# Patient Record
Sex: FEMALE | Race: WHITE
[De-identification: names, ages, dates, MRNs, and addresses within clinical notes are randomized per-mention and may not be internally consistent; named-entity substitution may affect disease eponyms.]

---

## 2017-01-03 ENCOUNTER — HOSPITAL ENCOUNTER (OUTPATIENT)
Dept: HOSPITAL 75 - SDC | Age: 82
Discharge: HOME | End: 2017-01-03
Attending: INTERNAL MEDICINE
Payer: MEDICARE

## 2017-01-03 VITALS — SYSTOLIC BLOOD PRESSURE: 131 MMHG | DIASTOLIC BLOOD PRESSURE: 68 MMHG

## 2017-01-03 VITALS — WEIGHT: 130.5 LBS | HEIGHT: 64 IN | BODY MASS INDEX: 22.28 KG/M2

## 2017-01-03 VITALS — SYSTOLIC BLOOD PRESSURE: 143 MMHG | DIASTOLIC BLOOD PRESSURE: 51 MMHG

## 2017-01-03 VITALS — SYSTOLIC BLOOD PRESSURE: 124 MMHG | DIASTOLIC BLOOD PRESSURE: 68 MMHG

## 2017-01-03 DIAGNOSIS — K52.9: Primary | ICD-10-CM

## 2017-01-03 RX ADMIN — MIDAZOLAM HYDROCHLORIDE PRN MG: 1 INJECTION, SOLUTION INTRAMUSCULAR; INTRAVENOUS at 08:18

## 2017-01-03 RX ADMIN — FENTANYL CITRATE PRN MCG: 50 INJECTION, SOLUTION INTRAMUSCULAR; INTRAVENOUS at 08:20

## 2017-01-03 RX ADMIN — MIDAZOLAM HYDROCHLORIDE PRN MG: 1 INJECTION, SOLUTION INTRAMUSCULAR; INTRAVENOUS at 08:10

## 2017-01-03 RX ADMIN — FENTANYL CITRATE PRN MCG: 50 INJECTION, SOLUTION INTRAMUSCULAR; INTRAVENOUS at 08:11

## 2017-01-03 NOTE — PRE-OP NOTE & CONSCIOUS SEDAT
Pre-Operative Progress Note


H&P Reviewed


The H&P was reviewed, patient examined and no changes noted.


Date H&P Reviewed:  Rupert 3, 2017


Time H&P Reviewed:  08:00





Conscious Sedation Pre-Proced


ASA Class:  3











Airway Mallampati Classification: ([place] appropriate class) I.  II.  III,  IV


 


Lungs 


 


Heart 


 


 ASA score


 


 ASA 1: a normal healthy patient


 


 ASA 2:  a patient with a mild systemic disease (mid diabetes, controlled 

hypertension, obesity 


 


 ASA 3:  a patient with a severe systemic disease that limits activity  (angina

, COPD, prior Myocardial infarction)


 


 ASA 4:  a patient with an incapacitating disease that is a constant threat to 

life (CHF, renal failure)


 


 ASA 5:  a moribund patient not expected to survive 24 hrs.  (ruptured aneurysm)


 


 ASA 6:  a declared brain dead patient whose organs are being harvested.


 


 For emergent operations, add the letter E after the classification








Grade 2


Sedation Plan:  Analgesia, Amnesia, Plan communicated to team members, 

Discussed options with patient/fam, Discussed risks with patient/fam


Note


The patient is an appropriate candidate to undergo the planned procedure, 

sedation, and anesthesia.





The patient immediately re-assessed prior to indication.








LIZETTE ROSAS MD Rupert 3, 2017 08:00

## 2017-01-03 NOTE — HISTORY AND PHYSICAL
DATE OF ADMISSION:  01/03/2017

 

COLONOSCOPY HISTORY AND PHYSICAL:  

 

DICTATING PHYSICIAN:   

Dr. Morris 

 

Ms. Sellers is a frail 84-year-old white female who awoke from

sleep with abdominal discomfort. This was followed by vomiting

and then bright red blood per rectum. This occurred Wednesday

night.  Thursday morning she presented to the emergency room

having had 1 or 2 more small volume loose, bloody stools. She

denied chills or fever. She comes with her , who actually

she defers to answering a number of the questions placed to her.

She was alert and oriented and appropriate during the interview

and appropriately recalling past medical history. She believes

that she has been slowly losing weight and reports no past

history of colonoscopy or GI procedure. She does have a past

history of breast cancer for which she underwent mastectomy at

the age of 27. She had apparent prophylactic mastectomy in 1993

on the opposite side. She tends towards constipation; had not had

any bowel habit changes up until Wednesday evening. Currently her

diarrhea has resolved. Dr. Gannon did a rectal examination in the

emergency room and reported bloody mucus only and no

abnormalities to digital inspection. A CT scan was obtained and

there was an area of thickening noted in the descending colon,

without abnormal pericolonic inflammatory findings.  There was no

evidence for distention and no evidence for reported diverticular

disease. There was a 7 mm micronodular density in the left

lateral lobe of the liver but it was stable compared to a CT scan

done in July 2013. 

 

PAST MEDICAL HISTORY:

Significant for depression and urinary incontinence with

overactive bladder. She follows with Dr. Tawil for this and had

the vaginal sling procedure 5 years ago that did not improve her

incontinence per her report.  She has a past history of

depression and does have frequent urinary tract infection for

which she is on trimethoprim for prophylaxis at h.s. 100 mg.  

 

OTHER MEDICATIONS:

Include: 

1.   Trazodone 50 mg daily. 

2.   Senna 1 tablet b.i.d. 

3.   paroxetine 40 mg daily. 

4.   Myrbetriq 25 mg daily 

5.   L-thyroxine 75 micrograms daily. 

6.   Ibuprofen 600 mg q.6 hours p.r.n.  

7.   Nexium 40 mg daily. 

8.   Acetaminophen p.r.n. 

 

SOCIAL HISTORY:

She is retired, lives with her  at home and who is her

primary caregiver. She presented to the office in a wheelchair.

She has a past 40 pack-year smoking history but quit 30 years ago

with no significant alcohol intake. 

 

FAMILY HISTORY:

She is not aware of any family history for colon cancer. 

 

PHYSICAL EXAMINATION: Revealed a frail, elderly white female who

appeared to be in no acute distress. 

NECK: Revealed no JVD, adenopathy or bruits. 

CHEST: Clear. 

CV: Revealed a regular rate and rhythm with a soft 1 to 2/6

systolic ejection murmur heard best at the second right

intercostal space without evidence for pulsus parvus or tardus. 

ABDOMEN: Soft. Supple without masses, organomegaly or tenderness.

Bowel sounds are positive. No bruits are noted. 

EXTREMITIES: Reveal 1+ bilateral edema to the mid tibia. 

 

LABORATORY EVALUATION:

From the emergency room revealed hemoglobin of 14.1. Her

chemistry panel was unremarkable except for a BUN of 19,

creatinine was 0.8. Blood sugar was mildly elevated at 140,

nonfasting.  Macrocytosis was reported with an MCV of 106,

platelet count was normal at 246. 

 

ASSESSMENT:

1.   For further investigation of CT abnormalities in the

descending colon concerning for cancer with bight red blood per

rectum, the patient was given the sports drink prep and set up

for Tuesday the 3rd. In explaining colonoscopy and review of her

emergency room records, history obtaining and answering

questions, 45 minutes of care time was spent by myself with

another 15 minutes of staff time setting up the procedure and

going over prep instructions.  In addition we will have the

patient take 4 mg of Zofran an hour before starting the prep. 

2.   Macrocytosis without anemia, we will defer to Dr. Martin in

regards to whether or not a B12 level needs to be drawn if this

has not been done in the recent past. 

 

Sincerely, 

 

 

 

Job ID: [id number] 

Dictated Date: 12/30/2016 12:14:00 

Transcription Date: 12/30/2016 16:16:32/astrid GUERRERO

## 2017-01-03 NOTE — PROCEDURE REPORT
PROCEDURE PHYSICIAN:   LIZETTE ROSAS

 

DATE OF PROCEDURE:  01/03/2017

 

COLONOSCOPY SUMMARY:  

 

PRIMARY CARE PHYSICIAN: 

Dr. Martin 

 

INDICATION FOR THE PROCEDURE:

Rectal bleeding, abnormal descending colonic findings on CT

abdomen. 

 

PROCEDURE:

The patient was placed in left lateral decubitus position. Prior

to undergoing colonoscopy, digital rectal evaluation was

performed. Anal sphincter tone was lax and the perianal reflex

was not elicited. No other abnormalities were noted to digital

inspection of the distal rectal vault or anal canal. The

colonoscope was inserted into the rectum and under visualization,

advanced to the cecum. The cecum was identified by identification

of the ileocecal valve and cecal strap.  Careful inspection was

made as the colonoscope was withdrawn. The patient had more

sensitivity than expected considering her age and required little

more medication than I would have expected. 

 

FINDINGS:

There was no evidence for internal or external hemorrhoids. The

rectum was unremarkable. The sigmoid colon was unremarkable as

well. No diverticulum were noted. No evidence for neoplasia was

noted. The majority of the descending colon was erythematous with

mild edema. No evidence for ulceration was noted. A biopsy was

obtained. No induration of the colon in these areas was present

nor was significant friability noted. The transverse colon,

hepatic flexure, ascending colon and cecum were unremarkable. 

 

ASSESSMENT:

Diffuse inflammatory change involving the majority of the

descending colon is present without evidence for ulceration.

Considering this patient's advanced age and frail status, resolving

ischemic colitis is strongly suspected. A biopsy was obtained and

submitted for histopathology. There is no evidence for neoplasia

on today's evaluation. Only other abnormality noted was lax anal

sphincter tone and absence of the perianal reflex. The patient

was reassured by today's findings.  Symptoms have improved, there

has been no further rectal bleeding. If pathology suggests

something other than ischemic colitis, there will be an addendum

to this report. I thank you for the referral of this pleasant

lady. 

 

Sincerely,  

 

 

 

 

Job ID: [id number]

Dictated Date: 01/03/2017 08:54:11 

Transcription Date: 01/03/2017 09:43:43 / astrid GUERRERO

## 2017-01-03 NOTE — XMS REPORT
Continuity of Care Document

 Created on: 2016



SARAH GARCIA

External Reference #: [id number]

: 1932

Sex: Female



Demographics







 Address  [address] [place] [address]  [address]

 

 Home Phone  [phone]

 

 Preferred Language  English

 

 Marital Status  Unknown

 

 Religion Affiliation  Unknown

 

 Race  Unknown

 

 Ethnic Group  Unknown





Author







 Author  [facility]

 

 Organization  [facility]

 

 Address  Unknown

 

 Phone  Unavailable







Support







 Name  Relationship  Address  Phone

 

 CULLEN SHEPHERD MD  Caregiver  [address], [address]

[place]  [address] [phone]

 

 GUY VILLELA MD  Caregiver  [address] [place]  [address] [phone]

 

 ANAND GARCIA  Next Of Kin  [address] [place] [address]  [address] [phone]







Care Team Providers







 Care Team Member Name  Role  Phone

 

 GUY VILLELA MD  PCP  [phone]







Insurance Providers







 Payer Name  Policy Number  Subscriber Name  Relationship

 

 [employer]  [id number]  Sarah Garcia  18 Self / Same As Patient

 

  [employer] Life  [id number]  Anand Garcia  01 







Advance Directives







 Directive  Response  Recorded Date/Time

 

 Advance Directives  No  16 2:31pm

 

 Health Care Power of   No  16 2:31pm

 

 Organ Donor  No  16 2:31pm

 

 Resuscitation Status  Full Code  16 2:31pm







Chief Complaint and Reason for Visit







 Chief Complaint  Abdominal/GI Problems

 

 Reason for Visit  hematochezia







Problems

Active Problems







 Medical Problem  Onset Date  Status

 

 Left hip pain  Unknown  Acute

 

 Low back pain  Unknown  Acute

 

 burst fracture L1 vertebral body  Unknown  Acute







Medications

Current Home Medications







 Medication  Dose  Units  Route  Directions  Days/Qty  Instructions  Start Date

 

 Paroxetine Hcl 40 Mg  40  Mg  Oral  Daily        13

 

 Esomeprazole Magnesium 40 Mg  1  Cap  Oral  Daily  30     13

 

 Trimethoprim 100 Mg  100  Mg  Oral  Bedtime        13

 

 Mirabegron 25 Mg  25  Mg  Oral  Daily        13

 

 Acetaminophen 500 Mg  1,000  Mg  Oral  Three Times A Day  90     13

 

 Bisacodyl 10 Mg  10  Mg  Rectal  Daily as needed for Constipation  10     

 

 Levothyroxine Sodium 75 Mcg  75  Mcg  Oral  Daily@0630  30     13

 

 Senna 1 Ea  1  Ea  Oral  Twice A Day  60     13

 

 Trazodone Hcl 50 Mg  50  Mg  Oral  Bedtime  30     13

 

 Ibuprofen 600 Mg  600  Mg  Oral  Every 6 Hours as needed for Pain  





Past Home Medications







 Medication  Directions  Ordered  Status

 

 Trazodone Hcl 50 Mg Tablet,     01/14/10  Discontinued

 

 Paroxetine Hcl 20 Mg Tablet,     01/14/10  Discontinued

 

 Thyroid 60 Mg Tablet,     01/14/10  Discontinued

 

 Pantoprazole Sodium 40 Mg Tablet.,     01/14/10  Discontinued

 

 Ezetimibe 10 Mg Tablet,     01/14/10  Discontinued

 

 Nitrofurantoin Macrocrystals 100 Mg Capsule, 1 Each Oral  Twice A Day    Discontinued

 

 Donepezil Hcl 5 Mg Tab, 5 Mg Oral  Daily  13  Discontinued

 

 Menthol/Lanolin/Calamine/Znox 71 Gm Oint..gm., 0 Gm Topically  Three Times A 
Day  13  Discontinued

 

 Ondansetron Hcl 4 Mg Tab, 4 Mg Oral  Give Every 4 Hrs On Schedule as needed 
for Nausea/Vomiting  13  Discontinued







Social History







 Social History Problem  Response  Recorded Date/Time

 

 Alcohol Use  Denies Use  2013 1:18pm

 

 Recreational Drug Use  No  2013 1:18pm

 

 Recent Foreign Travel  No  2013 1:18pm

 

 Recent Infectious Disease Exposure  No  2013 1:18pm

 

 Hospitalization with Isolation  Denies  2013 5:20pm

 

 Sexually Transmitted Disease  No  2013 1:18pm

 

 HIV/AIDS  No  2013 1:18pm

 

 Smoking Status  Former Smoker  2016 2:31pm

 

 Type Used  Cigarettes  2016 12:03am

 

 Recent Hopitalizations  No  2016 12:03am

 

 Sexually Transmitted Disease  No  2013 1:18pm

 

 Hospitalization with Isolation  Denies  2013 5:20pm

 

 Hx Sexually Transmitted Disorders  No  2009 11:21pm









 Query  Response  Start Date  Stop Date

 

 Smoking Status  Former Smoker      







Hospital Discharge Instructions

No hospital discharge instructions.



Plan of Care







 Discharge Date  16 6:00pm

 

 Disposition  01 HOME, SELF-CARE

 

 Condition at Discharge  Stable/Unchanged

 

 Instructions/Education Provided  Gastrointestinal Bleeding (DC)

 

 Prescriptions  See Medication Section

 

 Referrals  GUY VILLELA MD - Primary Care Physician

 

 Additional Instructions/Education  All discharge instructions reviewed with 
patient and/or family. Voiced 

understanding.  

Call Dr. Morris's office tomorrow morning before noon.  The office number is 

 [phone].  you'll be given an appointment and instructions on colonoscopy.  

If you continue to have large bloody stools or if you feel dizzy as if she 
might 

pass out when standing, return to the emergency room







Functional Status

No functional status results.



Allergies, Adverse Reactions, Alerts







 Allergen  Type  Severity  Reaction  Status  Last Updated

 

 NKANo Known Allergies  Allergy  Unknown     Active  06







Immunizations

No immunization records.



Vital Signs

Acute Vital Signs





 Vital  Response  Date/Time

 

 Temperature (Fahrenheit)  98.5 degrees F (97.6 - 99.5)  2016 2:31pm

 

 Temperature (Calculated Celsius)  36.50276 degrees C (36.4 - 37.5)  2016 
2:31pm

 

 Pulse Rate (adult)  83 bpm (60 - 90)  2016 2:31pm

 

 Respiratory Rate  18 bpm (12 - 24)  2016 2:31pm

 

 Blood Pressure  146/58 mm Hg  2016 2:31pm

 

    Blood Pressure Mean  87 mm Hg  2016 2:31pm

 

 Pain      

 

    Numeric Pain Scale  3  2016 2:31pm

 

 Height (Feet)  5 feet  2016 2:31pm

 

 Height (Inches)  4 inches  2016 2:31pm

 

 Height (Calculated Centimeters)  162.257209 cm  2016 2:31pm

 

 Weight (Pounds)  130 pounds  2016 2:31pm

 

 Weight (Calculated Kilograms)  58.701059 kilograms  2016 2:31pm

 

 Capillary Refill      

 

    Capillary Refill  Less Than 3 Seconds  2016 2:31pm

 

 Height  5 ft 4 in   

 

 Weight  130 lb   

 

 Body Mass Index  22.3 kg/m^2   







Results

Laboratory Results







 Test Name  Result  Units  Flags  Reference  Collection Date/Time  Result Date/
Time  Comments

 

 White Blood Count  15.7  10^3/uL  H  4.3-11.0  2016 2:40pm  2016 3:
01pm   

 

 Red Blood Count  3.85  10^6/uL  L  4.35-5.85  2016 2:40pm  2016 3:
01pm   

 

 Hemoglobin  14.1  G/DL     11.5-16.0  2016 2:40pm  2016 3:01pm   

 

 Hematocrit  41  %     35-52  2016 2:40pm  2016 3:01pm   

 

 Mean Corpuscular Volume  106  FL  H  80-99  2016 2:40pm  2016 3:
01pm   

 

 Mean Corpuscular Hemoglobin  37  PG  H  25-34  2016 2:40pm  2016 3:
01pm   

 

 Mean Corpuscular Hemoglobin Concent  35  G/DL     32-36  2016 2:40pm   3:01pm   

 

 Red Cell Distribution Width  17.0  %  H  10.0-14.5  2016 2:40pm  2016 3:01pm   

 

 Platelet Count  246  10^3/uL     130-400  2016 2:40pm  2016 3:01pm
   

 

 Mean Platelet Volume  11.1  FL  H  7.4-10.4  2016 2:40pm  2016 3:
01pm   

 

 Neutrophils (%) (Auto)  70  %     42-75  2016 2:40pm  2016 3:01pm 
  

 

 Lymphocytes (%) (Auto)  21  %     12-44  2016 2:40pm  2016 3:01pm 
  

 

 Monocytes (%) (Auto)  6  %     0-12  2016 2:40pm  2016 3:01pm   

 

 Eosinophils (%) (Auto)  3  %     0-10  2016 2:40pm  2016 3:01pm   

 

 Basophils (%) (Auto)  1  %     0-10  2016 2:40pm  2016 3:01pm   

 

 Neutrophils # (Auto)  11.0  X 10^3  H  1.8-7.8  2016 2:40pm  2016 3
:01pm   

 

 Lymphocytes # (Auto)  3.3  X 10^3     1.0-4.0  2016 2:40pm  2016 3:
01pm   

 

 Monocytes # (Auto)  1.0  X 10^3     0.0-1.0  2016 2:40pm  2016 3:
01pm   

 

 Eosinophils # (Auto)  0.4  10^3/uL  H  0.0-0.3  2016 2:40pm  2016 3
:01pm   

 

 Basophils # (Auto)  0.1  10^3/uL     0.0-0.1  2016 2:40pm  2016 3:
01pm   

 

 Neutrophils % (Manual)  68  %        2016 2:40pm  2016 3:43pm   

 

 Band Neutrophils  8  %        2016 2:40pm  2016 3:43pm   

 

 Lymphocytes % (Manual)  16  %        2016 2:40pm  2016 3:43pm   

 

 Monocytes % (Manual)  2  %        2016 2:40pm  2016 3:43pm   

 

 Eosinophils % (Manual)  0  %        2016 2:40pm  2016 3:43pm   

 

 Basophils % (Manual)  1  %        2016 2:40pm  2016 3:43pm   

 

 Reactive Lymphocytes  5  %        2016 2:40pm  2016 3:43pm   

 

 Anisocytosis  MARKED           2016 2:40pm  2016 3:43pm   

 

 Macrocytosis  MODERATE           2016 2:40pm  2016 3:43pm   

 

 Sodium Level  138  MMOL/L     135-145  2016 2:40pm  2016 3:18pm   

 

 Potassium Level  3.8  MMOL/L     3.6-5.0  2016 2:40pm  2016 3:18pm
   

 

 Chloride Level  103  MMOL/L       2016 2:40pm  2016 3:18pm   

 

 Carbon Dioxide Level  23  MMOL/L     21-32  2016 2:40pm  2016 3:
18pm   

 

 Anion Gap  12  MMOL/L     5-14  2016 2:40pm  2016 3:18pm   

 

 Blood Urea Nitrogen  19  MG/DL  H  7-18  2016 2:40pm  2016 3:18pm 
  

 

 Creatinine  0.82  MG/DL     0.60-1.30  2016 2:40pm  2016 3:18pm   

 

 BUN/Creatinine Ratio  23           2016 2:40pm  2016 3:18pm   

 

 Estimat Glomerular Filtration Rate  > 60           2016 2:40pm  2016 3:18pm                    GFR INTERPRETIVE DATA  

  

         UNITS FOR ESTIMATED GFR (eGFR): mL/min/1.73 M2  

         REFERENCE RANGE FOR ESTIMATED GFR (eGFR)  

                                          eGFR  

         NORMAL eGFR                       >60  

         MODERATELY DECREASED eGFR          30-59  

         SEVERLY DECREASED eGFR             15-29  

         KIDNEY FAILURE                    <15 (OR DIALYSIS)  

 

 Glucose Level  140  MG/DL  H    2016 2:40pm  2016 3:18pm   

 

 Calcium Level  8.9  MG/DL     8.5-10.1  2016 2:40pm  2016 3:18pm   

 

 Total Bilirubin  0.9  MG/DL     0.1-1.0  2016 2:40pm  2016 3:18pm 
  

 

 Alkaline Phosphatase  69  U/L       2016 2:40pm  2016 3:18pm
   

 

 Aspartate Amino Transf (AST/SGOT)  39  U/L  H  5-34  2016 2:40pm  2016 3:18pm   

 

 Alanine Aminotransferase (ALT/SGPT)  37  U/L     0-55  2016 2:40pm   3:18pm   

 

 Total Protein  7.5  G/DL     6.4-8.2  2016 2:40pm  2016 3:18pm   

 

 Albumin  4.0  G/DL     3.2-4.5  2016 2:40pm  2016 3:18pm   







Procedures

No known history of procedures.



Encounters







 Encounter  Location  Arrival/Admit Date  Discharge/Depart Date  Attending 
Provider

 

 Departed Emergency Room  [facility]  16 2:22pm   6:00pm  CULLEN SHEPHERD MD











 Recent Diagnosis

## 2017-01-03 NOTE — XMS REPORT
Continuity of Care Document

 Created on: 2016



SARAH GARCIA

External Reference #: [id number]

: 1932

Sex: Female



Demographics







 Address  [address] [place] [address]  [address]

 

 Home Phone  [phone]

 

 Preferred Language  English

 

 Marital Status  Unknown

 

 Baptism Affiliation  Unknown

 

 Race  Unknown

 

 Ethnic Group  Unknown





Author







 Author  [facility]

 

 Organization  [facility]

 

 Address  Unknown

 

 Phone  Unavailable







Support







 Name  Relationship  Address  Phone

 

 CULLEN SHEPHERD MD  Caregiver  [address], [address]

[place]  [address] [phone]

 

 GUY VILLELA MD  Caregiver  [address] [place]  [address] [phone]

 

 ANAND GARCIA  Next Of Kin  [address] [place] [address]  [address] [phone]







Care Team Providers







 Care Team Member Name  Role  Phone

 

 GUY VILLELA MD  PCP  [phone]







Insurance Providers







 Payer Name  Policy Number  Subscriber Name  Relationship

 

 [employer]  [id number]  Sarah Garcia  18 Self / Same As Patient

 

  For Life  [id number]  Anand Garcia  01 







Advance Directives







 Directive  Response  Recorded Date/Time

 

 Advance Directives  No  16 2:31pm

 

 Health Care Power of   No  16 2:31pm

 

 Organ Donor  No  16 2:31pm

 

 Resuscitation Status  Full Code  16 2:31pm







Chief Complaint and Reason for Visit







 Chief Complaint  Abdominal/GI Problems

 

 Reason for Visit  hematochezia







Problems

Active Problems







 Medical Problem  Onset Date  Status

 

 Left hip pain  Unknown  Acute

 

 Low back pain  Unknown  Acute

 

 burst fracture L1 vertebral body  Unknown  Acute







Medications

Current Home Medications







 Medication  Dose  Units  Route  Directions  Days/Qty  Instructions  Start Date

 

 Paroxetine Hcl 40 Mg  40  Mg  Oral  Daily        13

 

 Esomeprazole Magnesium 40 Mg  1  Cap  Oral  Daily  30     13

 

 Trimethoprim 100 Mg  100  Mg  Oral  Bedtime        13

 

 Mirabegron 25 Mg  25  Mg  Oral  Daily        13

 

 Acetaminophen 500 Mg  1,000  Mg  Oral  Three Times A Day  90     13

 

 Bisacodyl 10 Mg  10  Mg  Rectal  Daily as needed for Constipation  10     

 

 Levothyroxine Sodium 75 Mcg  75  Mcg  Oral  Daily@0630  30     13

 

 Senna 1 Ea  1  Ea  Oral  Twice A Day  60     13

 

 Trazodone Hcl 50 Mg  50  Mg  Oral  Bedtime  30     13

 

 Ibuprofen 600 Mg  600  Mg  Oral  Every 6 Hours as needed for Pain  





Past Home Medications







 Medication  Directions  Ordered  Status

 

 Trazodone Hcl 50 Mg Tablet,     01/14/10  Discontinued

 

 Paroxetine Hcl 20 Mg Tablet,     01/14/10  Discontinued

 

 Thyroid 60 Mg Tablet,     01/14/10  Discontinued

 

 Pantoprazole Sodium 40 Mg Tablet.,     01/14/10  Discontinued

 

 Ezetimibe 10 Mg Tablet,     01/14/10  Discontinued

 

 Nitrofurantoin Macrocrystals 100 Mg Capsule, 1 Each Oral  Twice A Day    Discontinued

 

 Donepezil Hcl 5 Mg Tab, 5 Mg Oral  Daily  13  Discontinued

 

 Menthol/Lanolin/Calamine/Znox 71 Gm Oint..gm., 0 Gm Topically  Three Times A 
Day  13  Discontinued

 

 Ondansetron Hcl 4 Mg Tab, 4 Mg Oral  Give Every 4 Hrs On Schedule as needed 
for Nausea/Vomiting  13  Discontinued







Social History







 Social History Problem  Response  Recorded Date/Time

 

 Alcohol Use  Denies Use  2013 1:18pm

 

 Recreational Drug Use  No  2013 1:18pm

 

 Recent Foreign Travel  No  2013 1:18pm

 

 Recent Infectious Disease Exposure  No  2013 1:18pm

 

 Hospitalization with Isolation  Denies  2013 5:20pm

 

 Sexually Transmitted Disease  No  2013 1:18pm

 

 HIV/AIDS  No  2013 1:18pm

 

 Smoking Status  Former Smoker  2016 2:31pm

 

 Type Used  Cigarettes  2016 12:03am

 

 Recent Hopitalizations  No  2016 12:03am

 

 Sexually Transmitted Disease  No  2013 1:18pm

 

 Hospitalization with Isolation  Denies  2013 5:20pm

 

 Hx Sexually Transmitted Disorders  No  2009 11:21pm









 Query  Response  Start Date  Stop Date

 

 Smoking Status  Former Smoker      







Hospital Discharge Instructions

No hospital discharge instructions.



Plan of Care







 Discharge Date  16 6:00pm

 

 Disposition  01 HOME, SELF-CARE

 

 Condition at Discharge  Stable/Unchanged

 

 Instructions/Education Provided  Gastrointestinal Bleeding (DC)

 

 Prescriptions  See Medication Section

 

 Referrals  GUY VILLELA MD - Primary Care Physician

 

 Additional Instructions/Education  All discharge instructions reviewed with 
patient and/or family. Voiced 

understanding.  

Call Dr. Morris's office tomorrow morning before noon.  The office number is 

 [phone].  you'll be given an appointment and instructions on colonoscopy.  

If you continue to have large bloody stools or if you feel dizzy as if she 
might 

pass out when standing, return to the emergency room







Functional Status

No functional status results.



Allergies, Adverse Reactions, Alerts







 Allergen  Type  Severity  Reaction  Status  Last Updated

 

 NKANo Known Allergies  Allergy  Unknown     Active  06







Immunizations

No immunization records.



Vital Signs

Acute Vital Signs





 Vital  Response  Date/Time

 

 Temperature (Fahrenheit)  98.5 degrees F (97.6 - 99.5)  2016 2:31pm

 

 Temperature (Calculated Celsius)  36.53153 degrees C (36.4 - 37.5)  2016 
2:31pm

 

 Pulse Rate (adult)  83 bpm (60 - 90)  2016 2:31pm

 

 Respiratory Rate  18 bpm (12 - 24)  2016 2:31pm

 

 Blood Pressure  146/58 mm Hg  2016 2:31pm

 

    Blood Pressure Mean  87 mm Hg  2016 2:31pm

 

 Pain      

 

    Numeric Pain Scale  3  2016 2:31pm

 

 Height (Feet)  5 feet  2016 2:31pm

 

 Height (Inches)  4 inches  2016 2:31pm

 

 Height (Calculated Centimeters)  162.814134 cm  2016 2:31pm

 

 Weight (Pounds)  130 pounds  2016 2:31pm

 

 Weight (Calculated Kilograms)  58.328831 kilograms  2016 2:31pm

 

 Capillary Refill      

 

    Capillary Refill  Less Than 3 Seconds  2016 2:31pm

 

 Height  5 ft 4 in   

 

 Weight  130 lb   

 

 Body Mass Index  22.3 kg/m^2   







Results

Laboratory Results







 Test Name  Result  Units  Flags  Reference  Collection Date/Time  Result Date/
Time  Comments

 

 White Blood Count  15.7  10^3/uL  H  4.3-11.0  2016 2:40pm  2016 3:
01pm   

 

 Red Blood Count  3.85  10^6/uL  L  4.35-5.85  2016 2:40pm  2016 3:
01pm   

 

 Hemoglobin  14.1  G/DL     11.5-16.0  2016 2:40pm  2016 3:01pm   

 

 Hematocrit  41  %     35-52  2016 2:40pm  2016 3:01pm   

 

 Mean Corpuscular Volume  106  FL  H  80-99  2016 2:40pm  2016 3:
01pm   

 

 Mean Corpuscular Hemoglobin  37  PG  H  25-34  2016 2:40pm  2016 3:
01pm   

 

 Mean Corpuscular Hemoglobin Concent  35  G/DL     32-36  2016 2:40pm   3:01pm   

 

 Red Cell Distribution Width  17.0  %  H  10.0-14.5  2016 2:40pm  2016 3:01pm   

 

 Platelet Count  246  10^3/uL     130-400  2016 2:40pm  2016 3:01pm
   

 

 Mean Platelet Volume  11.1  FL  H  7.4-10.4  2016 2:40pm  2016 3:
01pm   

 

 Neutrophils (%) (Auto)  70  %     42-75  2016 2:40pm  2016 3:01pm 
  

 

 Lymphocytes (%) (Auto)  21  %     12-44  2016 2:40pm  2016 3:01pm 
  

 

 Monocytes (%) (Auto)  6  %     0-12  2016 2:40pm  2016 3:01pm   

 

 Eosinophils (%) (Auto)  3  %     0-10  2016 2:40pm  2016 3:01pm   

 

 Basophils (%) (Auto)  1  %     0-10  2016 2:40pm  2016 3:01pm   

 

 Neutrophils # (Auto)  11.0  X 10^3  H  1.8-7.8  2016 2:40pm  2016 3
:01pm   

 

 Lymphocytes # (Auto)  3.3  X 10^3     1.0-4.0  2016 2:40pm  2016 3:
01pm   

 

 Monocytes # (Auto)  1.0  X 10^3     0.0-1.0  2016 2:40pm  2016 3:
01pm   

 

 Eosinophils # (Auto)  0.4  10^3/uL  H  0.0-0.3  2016 2:40pm  2016 3
:01pm   

 

 Basophils # (Auto)  0.1  10^3/uL     0.0-0.1  2016 2:40pm  2016 3:
01pm   

 

 Neutrophils % (Manual)  68  %        2016 2:40pm  2016 3:43pm   

 

 Band Neutrophils  8  %        2016 2:40pm  2016 3:43pm   

 

 Lymphocytes % (Manual)  16  %        2016 2:40pm  2016 3:43pm   

 

 Monocytes % (Manual)  2  %        2016 2:40pm  2016 3:43pm   

 

 Eosinophils % (Manual)  0  %        2016 2:40pm  2016 3:43pm   

 

 Basophils % (Manual)  1  %        2016 2:40pm  2016 3:43pm   

 

 Reactive Lymphocytes  5  %        2016 2:40pm  2016 3:43pm   

 

 Anisocytosis  MARKED           2016 2:40pm  2016 3:43pm   

 

 Macrocytosis  MODERATE           2016 2:40pm  2016 3:43pm   

 

 Sodium Level  138  MMOL/L     135-145  2016 2:40pm  2016 3:18pm   

 

 Potassium Level  3.8  MMOL/L     3.6-5.0  2016 2:40pm  2016 3:18pm
   

 

 Chloride Level  103  MMOL/L       2016 2:40pm  2016 3:18pm   

 

 Carbon Dioxide Level  23  MMOL/L     21-32  2016 2:40pm  2016 3:
18pm   

 

 Anion Gap  12  MMOL/L     5-14  2016 2:40pm  2016 3:18pm   

 

 Blood Urea Nitrogen  19  MG/DL  H  7-18  2016 2:40pm  2016 3:18pm 
  

 

 Creatinine  0.82  MG/DL     0.60-1.30  2016 2:40pm  2016 3:18pm   

 

 BUN/Creatinine Ratio  23           2016 2:40pm  2016 3:18pm   

 

 Estimat Glomerular Filtration Rate  > 60           2016 2:40pm  2016 3:18pm                    GFR INTERPRETIVE DATA  

  

         UNITS FOR ESTIMATED GFR (eGFR): mL/min/1.73 M2  

         REFERENCE RANGE FOR ESTIMATED GFR (eGFR)  

                                          eGFR  

         NORMAL eGFR                       >60  

         MODERATELY DECREASED eGFR          30-59  

         SEVERLY DECREASED eGFR             15-29  

         KIDNEY FAILURE                    <15 (OR DIALYSIS)  

 

 Glucose Level  140  MG/DL  H    2016 2:40pm  2016 3:18pm   

 

 Calcium Level  8.9  MG/DL     8.5-10.1  2016 2:40pm  2016 3:18pm   

 

 Total Bilirubin  0.9  MG/DL     0.1-1.0  2016 2:40pm  2016 3:18pm 
  

 

 Alkaline Phosphatase  69  U/L       2016 2:40pm  2016 3:18pm
   

 

 Aspartate Amino Transf (AST/SGOT)  39  U/L  H  5-34  2016 2:40pm  2016 3:18pm   

 

 Alanine Aminotransferase (ALT/SGPT)  37  U/L     0-55  2016 2:40pm   3:18pm   

 

 Total Protein  7.5  G/DL     6.4-8.2  2016 2:40pm  2016 3:18pm   

 

 Albumin  4.0  G/DL     3.2-4.5  2016 2:40pm  2016 3:18pm   







Procedures

No known history of procedures.



Encounters







 Encounter  Location  Arrival/Admit Date  Discharge/Depart Date  Attending 
Provider

 

 Departed Emergency Room  [facility]  16 2:22pm   6:00pm  CULLEN SHEPHERD MD











 Recent Diagnosis

## 2017-09-14 ENCOUNTER — HOSPITAL ENCOUNTER (EMERGENCY)
Dept: HOSPITAL 75 - ER | Age: 82
Discharge: HOME | End: 2017-09-14
Payer: MEDICARE

## 2017-09-14 VITALS — SYSTOLIC BLOOD PRESSURE: 123 MMHG | DIASTOLIC BLOOD PRESSURE: 70 MMHG

## 2017-09-14 VITALS — WEIGHT: 120 LBS | HEIGHT: 64 IN | BODY MASS INDEX: 20.49 KG/M2

## 2017-09-14 DIAGNOSIS — F03.90: ICD-10-CM

## 2017-09-14 DIAGNOSIS — Z96.652: ICD-10-CM

## 2017-09-14 DIAGNOSIS — M19.90: ICD-10-CM

## 2017-09-14 DIAGNOSIS — Z98.890: ICD-10-CM

## 2017-09-14 DIAGNOSIS — E03.9: ICD-10-CM

## 2017-09-14 DIAGNOSIS — Z90.13: ICD-10-CM

## 2017-09-14 DIAGNOSIS — K21.9: ICD-10-CM

## 2017-09-14 DIAGNOSIS — Z87.891: ICD-10-CM

## 2017-09-14 DIAGNOSIS — M41.20: ICD-10-CM

## 2017-09-14 DIAGNOSIS — F32.9: ICD-10-CM

## 2017-09-14 DIAGNOSIS — M81.0: ICD-10-CM

## 2017-09-14 DIAGNOSIS — Z90.89: ICD-10-CM

## 2017-09-14 DIAGNOSIS — N39.0: Primary | ICD-10-CM

## 2017-09-14 DIAGNOSIS — Z90.710: ICD-10-CM

## 2017-09-14 LAB
ALBUMIN SERPL-MCNC: 3.8 GM/DL (ref 3.2–4.5)
ALT SERPL-CCNC: 25 U/L (ref 0–55)
ANION GAP SERPL CALC-SCNC: 10 MMOL/L (ref 5–14)
AST SERPL-CCNC: 31 U/L (ref 5–34)
BASOPHILS # BLD AUTO: 0.1 10^3/UL (ref 0–0.1)
BASOPHILS NFR BLD AUTO: 1 % (ref 0–10)
BILIRUB SERPL-MCNC: 0.9 MG/DL (ref 0.1–1)
BILIRUB UR QL STRIP: (no result)
BUN SERPL-MCNC: 22 MG/DL (ref 7–18)
BUN/CREAT SERPL: 26
CALCIUM SERPL-MCNC: 9.2 MG/DL (ref 8.5–10.1)
CHLORIDE SERPL-SCNC: 104 MMOL/L (ref 98–107)
CO2 SERPL-SCNC: 25 MMOL/L (ref 21–32)
CREAT SERPL-MCNC: 0.84 MG/DL (ref 0.6–1.3)
EOSINOPHIL # BLD AUTO: 0.7 10^3/UL (ref 0–0.3)
EOSINOPHIL NFR BLD AUTO: 7 % (ref 0–10)
ERYTHROCYTE [DISTWIDTH] IN BLOOD BY AUTOMATED COUNT: 17.1 % (ref 10–14.5)
GFR SERPLBLD BASED ON 1.73 SQ M-ARVRAT: > 60 ML/MIN
GLUCOSE SERPL-MCNC: 112 MG/DL (ref 70–105)
KETONES UR QL STRIP: (no result)
LEUKOCYTE ESTERASE UR QL STRIP: (no result)
LYMPHOCYTES # BLD AUTO: 3.6 X 10^3 (ref 1–4)
LYMPHOCYTES NFR BLD AUTO: 37 % (ref 12–44)
MCH RBC QN AUTO: 38 PG (ref 25–34)
MCHC RBC AUTO-ENTMCNC: 36 G/DL (ref 32–36)
MCV RBC AUTO: 105 FL (ref 80–99)
MONOCYTES # BLD AUTO: 0.9 X 10^3 (ref 0–1)
MONOCYTES NFR BLD AUTO: 10 % (ref 0–12)
NEUTROPHILS # BLD AUTO: 4.4 X 10^3 (ref 1.8–7.8)
NEUTROPHILS NFR BLD AUTO: 46 % (ref 42–75)
NITRITE UR QL STRIP: POSITIVE
PH UR STRIP: 5 [PH] (ref 5–9)
PLATELET # BLD: 282 10^3/UL (ref 130–400)
PMV BLD AUTO: 11.3 FL (ref 7.4–10.4)
POTASSIUM SERPL-SCNC: 4 MMOL/L (ref 3.6–5)
PROT SERPL-MCNC: 7.6 GM/DL (ref 6.4–8.2)
PROT UR QL STRIP: (no result)
RBC # BLD AUTO: 3.37 10^6/UL (ref 4.35–5.85)
SODIUM SERPL-SCNC: 139 MMOL/L (ref 135–145)
SP GR UR STRIP: 1.02 (ref 1.02–1.02)
UROBILINOGEN UR-MCNC: 4 MG/DL
WBC # BLD AUTO: 9.7 10^3/UL (ref 4.3–11)
WBC #/AREA URNS HPF: (no result) /HPF

## 2017-09-14 PROCEDURE — 85025 COMPLETE CBC W/AUTO DIFF WBC: CPT

## 2017-09-14 PROCEDURE — 87088 URINE BACTERIA CULTURE: CPT

## 2017-09-14 PROCEDURE — 51701 INSERT BLADDER CATHETER: CPT

## 2017-09-14 PROCEDURE — 81000 URINALYSIS NONAUTO W/SCOPE: CPT

## 2017-09-14 PROCEDURE — 87077 CULTURE AEROBIC IDENTIFY: CPT

## 2017-09-14 PROCEDURE — 87186 SC STD MICRODIL/AGAR DIL: CPT

## 2017-09-14 PROCEDURE — 80053 COMPREHEN METABOLIC PANEL: CPT

## 2017-09-14 PROCEDURE — 36415 COLL VENOUS BLD VENIPUNCTURE: CPT

## 2017-09-14 NOTE — ED GU-FEMALE
General


Chief Complaint:  -Female


Stated Complaint:  VAG BLEEDING


Nursing Triage Note:  


pt reports vaginal bleeding x 10 days. PT states she has had complications on 


and off since she had her bladder sx a year ago. Pt also reports low pelvic 


pain.


Nursing Sepsis Screen:  No Definite Risk


Source:  patient


Exam Limitations:  no limitations





History of Present Illness


Time seen by provider:  16:25


Initial Comments


The patient and her  report that she has been having blood which appears 

to be vaginal in the toilet for the last week or 10 days.  She has previously 

had a hysterectomy.  She had a bladder sling procedure performed by Dr. Tawil 

several years ago in hopes of improving her incontinency.  This has not been 

the case.  She reports incontinency and pain along the anterior pelvis.  She 

denies fever or chills.


Timing/Duration:  week


Severity/Quality:  moderate


Location:  suprapubic


Radiation:  none


Activities at Onset:  none


Sexual [place] History:  not active





Allergies and Home Medications


Allergies


Coded Allergies:  


     NKANo Known Allergies (Verified  Allergy, Unknown, 5/17/06)





Home Medications


Acetaminophen 500 Mg Tablet, 1,000 MG PO TID, #90


   Prescribed by: WILL LEHMAN on 11/22/13 1127


Bisacodyl 10 Mg Supp, 10 MG MO DAILY PRN for CONSTIPATION, #10


   Prescribed by: WILL LEHMAN on 11/22/13 1127


Esomeprazole Mag Trihydrate 40 Mg Capsule.dr, 1 CAP PO DAILY, #30 (Reported)


Ibuprofen 600 Mg Tablet, 600 MG PO Q6H PRN for PAIN, #20


   Prescribed by: SVETLANA CULVER on 9/9/16 0149


Levothyroxine Sodium 75 Mcg Tablet, 75 MCG PO DAILY@0630, #30


   Prescribed by: WILL LEHMAN on 11/22/13 1127


Mirabegron 25 Mg Tab.er.24h, 25 MG PO DAILY, (Reported)


Paroxetine Hcl 40 Mg Tablet, 40 MG PO DAILY, (Reported)


Senna 1 Ea Tablet, 1 EA PO BID, #60


   Prescribed by: WILL LEHMAN on 11/22/13 1127


Trazodone Hcl 50 Mg Tablet, 50 MG PO HS, #30


   Prescribed by: WILL LEHMAN on 11/22/13 1127


Trimethoprim 100 Mg Tablet, 100 MG PO HS, (Reported)





Constitutional:  see HPI


EENTM:  no symptoms reported


Respiratory:  no symptoms reported


Cardiovascular:  no symptoms reported


Gastrointestinal:  no symptoms reported


Genitourinary:  see HPI, hematuria, incontinence, pain


Musculoskeletal:  no symptoms reported


Skin:  no symptoms reported


Psychiatric/Neurological:  No Symptoms Reported





Past Medical-Social-Family Hx


Patient Social History


Alcohol Use:  Denies Use


Recreational Drug Use:  No


Smoking Status:  Former Smoker


Type Used:  Cigarettes


Former Smoker, Quit:  Sep 1, 1982


Recent Foreign Travel:  No


Contact w/Someone Who Travel:  No


Recent Infectious Disease Expo:  No


Recent Hopitalizations:  No


Physical Abuse:  No


Sexual Abuse:  No


Mistreated:  No





Immunizations Up To Date


Tetanus Booster (TDap):  Unknown


Date of Pneumonia Vaccine:  Nov 1, 2008


Date of Influenza Vaccine:  Oct 1, 2013





Seasonal Allergies


Seasonal Allergies:  No





Surgeries


History of Surgeries:  Yes (BLADDER TIE UP, mastectomy x2, l knee)


Surgeries:  Bladder Surgery, Breast, Hysterectomy, Orthopedic, Tonsillectomy





Respiratory


History of Respiratory Disorde:  No


Respiratory Disorders:  Chronic Bronchitis


Currently Using CPAP:  No


Currently Using BIPAP:  No





Cardiovascular


History of Cardiac Disorders:  No





Neurological


History of Neurological Disord:  Yes (PERIPHERHAL NEUROPATHY)


Neurological Disorders:  Dementia, Neuropathy





Reproductive System


Pregnant:  No


Hx Reproductive Disorders:  No


Sexually Transmitted Disease:  No


HIV/AIDS:  No


Female Reproductive Disorders:  Denies


GYN History:  Hysterectomy





Genitourinary


Genitourinary Disorders:  UTI-Chronic





Gastrointestinal


History of Gastrointestinal Di:  Yes


Gastrointestinal Disorders:  Gastroesophageal Reflux





Musculoskeletal


History of Musculoskeletal Dis:  Yes (LEFT KNEE REPLACEMENT; L1 BURST FRACTURE 

2013)


Musculoskeletal Disorders:  Osteoporosis, Arthritis, Fibromyalgia, Back Injury, 

Scoliosis





Endocrine


History of Endocrine Disorders:  Yes


Endocrine Disorders:  Hypothyroidsim





Cancer


History of Cancer:  No





Psychosocial


History of Psychiatric Problem:  Yes


Behavioral Health Disorders:  Depression


Suicide Risk Score:  0





Integumentary


History of Skin or Integumenta:  Yes (ROSACEA)





Blood Transfusions


History of Blood Disorders:  Yes (ANEMIA)





Physical Exam


Vital Signs





Vital Sign - Last 12Hours








 9/14/17





 15:02


 


Pulse 91


 


Resp 18


 


B/P (MAP) 131/57


 


Pulse Ox 95





Capillary Refill : Less Than 3 Seconds


General Appearance:  mild distress


HEENT:  normal ENT inspection


Neck:  full range of motion


Cardiovascular:  normal peripheral pulses, regular rate, rhythm, no edema, no 

gallop, no JVD, no murmur


Respiratory:  chest non-tender, lungs clear, normal breath sounds, no 

respiratory distress, no accessory muscle use, respiratory distress


Gastrointestinal:  normal bowel sounds, soft, no organomegaly, no pulsatile mass

, tenderness (suprapubic)


Back:  normal inspection, no CVA tenderness, no vertebral tenderness, CVA 

tenderness (R), CVA tenderness (L)


Extremities:  normal range of motion, non-tender, normal inspection, no pedal 

edema, no calf tenderness, normal capillary refill, pelvis stable


Neurologic/Psychiatric:  CNs II-XII nml as tested, no motor/sensory deficits, 

alert, normal mood/affect, oriented x 3


Skin:  normal color, warm/dry, cyanosis, cool, diaphoresis, pallor





Progress/Results/Core Measures


Results/Orders


Lab Results





Laboratory Tests








Test


  9/14/17


15:35 Range/Units


 


 


White Blood Count


  9.7 


  4.3-11.0


10^3/uL


 


Red Blood Count


  3.37 L


  4.35-5.85


10^6/uL


 


Hemoglobin 12.7  11.5-16.0  G/DL


 


Hematocrit 35  35-52  %


 


Mean Corpuscular Volume 105 H 80-99  FL


 


Mean Corpuscular Hemoglobin 38 H 25-34  PG


 


Mean Corpuscular Hemoglobin


Concent 36 


  32-36  G/DL


 


 


Red Cell Distribution Width 17.1 H 10.0-14.5  %


 


Platelet Count


  282 


  130-400


10^3/uL


 


Mean Platelet Volume 11.3 H 7.4-10.4  FL


 


Neutrophils (%) (Auto) 46  42-75  %


 


Lymphocytes (%) (Auto) 37  12-44  %


 


Monocytes (%) (Auto) 10  0-12  %


 


Eosinophils (%) (Auto) 7  0-10  %


 


Basophils (%) (Auto) 1  0-10  %


 


Neutrophils # (Auto) 4.4  1.8-7.8  X 10^3


 


Lymphocytes # (Auto) 3.6  1.0-4.0  X 10^3


 


Monocytes # (Auto) 0.9  0.0-1.0  X 10^3


 


Eosinophils # (Auto)


  0.7 H


  0.0-0.3


10^3/uL


 


Basophils # (Auto)


  0.1 


  0.0-0.1


10^3/uL


 


Urine Color BROWN H  


 


Urine Clarity VERY CLOUDY H  


 


Urine pH 5  5-9  


 


Urine Specific Gravity 1.025 H 1.016-1.022  


 


Urine Protein 3+ H NEGATIVE  


 


Urine Glucose (UA) NEGATIVE  NEGATIVE  


 


Urine Ketones 2+ H NEGATIVE  


 


Urine Nitrite POSITIVE H NEGATIVE  


 


Urine Bilirubin 1+ H NEGATIVE  


 


Urine Urobilinogen 4 H NORMAL  MG/DL


 


Urine Leukocyte Esterase 3+ H NEGATIVE  


 


Urine RBC (Auto) 5+ H NEGATIVE  


 


Urine RBC TNTC H  /HPF


 


Urine WBC TNTC H  /HPF


 


Urine Crystals NONE   /LPF


 


Urine Bacteria LARGE H  /HPF


 


Urine Casts NONE   /LPF


 


Urine Mucus NEGATIVE   /LPF


 


Urine Culture Indicated YES   


 


Sodium Level 139  135-145  MMOL/L


 


Potassium Level 4.0  3.6-5.0  MMOL/L


 


Chloride Level 104    MMOL/L


 


Carbon Dioxide Level 25  21-32  MMOL/L


 


Anion Gap 10  5-14  MMOL/L


 


Blood Urea Nitrogen 22 H 7-18  MG/DL


 


Creatinine


  0.84 


  0.60-1.30


MG/DL


 


Estimat Glomerular Filtration


Rate > 60 


   


 


 


BUN/Creatinine Ratio 26   


 


Glucose Level 112 H   MG/DL


 


Calcium Level 9.2  8.5-10.1  MG/DL


 


Total Bilirubin 0.9  0.1-1.0  MG/DL


 


Aspartate Amino Transf


(AST/SGOT) 31 


  5-34  U/L


 


 


Alanine Aminotransferase


(ALT/SGPT) 25 


  0-55  U/L


 


 


Alkaline Phosphatase 73    U/L


 


Total Protein 7.6  6.4-8.2  GM/DL


 


Albumin 3.8  3.2-4.5  GM/DL








My Orders





Orders - CULLEN SHEPHERD MD


Cbc With Automated Diff (9/14/17 15:19)


Comprehensive Metabolic Panel (9/14/17 15:19)


Ua Culture If Indicated (9/14/17 15:19)


Urine Culture (9/14/17 15:35)





Vital Signs/I&O





Vital Sign - Last 12Hours








 9/14/17





 15:02


 


Pulse 91


 


Resp 18


 


B/P (MAP) 131/57


 


Pulse Ox 95














Blood Pressure Mean:  81











Departure


Communication (Admissions)


Progress Notes


UA shows RBCs and WBCs too numerous to count.





Impression


Impression:  


 Primary Impression:  


 urinary tract infection


 Additional Impression:  


 Hematuria


Disposition:  01 HOME, SELF-CARE


Condition:  Stable/Unchanged





Departure-Patient Inst.


Decision time for Depature:  16:30


Referrals:  


GUY VILLELA MD (PCP/Family)


Primary Care Physician


Patient Instructions:  Urinary Tract Infection, Adult (DC)





Add. Discharge Instructions:  


All discharge instructions reviewed with patient and/or family. Voiced 

understanding.


Take medication as prescribed.  Make an appointment to see your provider next 

week for consideration of cystoscopy.





Norco for pain.  This is likely to be constipating.  Use MiraLAX to avoid this.


Scripts


Hydrocodone/Acetaminophen (Norco 5-325 Tablet) 1 Each Tablet


1 EACH PO 4 times a day, #10 TAB


   Prov: CULLEN SHEPHERD MD         9/14/17 


[nORCO]   No Conflict Check





   Prov: CULLEN SHEPHERD MD         9/14/17 


Cefdinir (Cefdinir) 300 Mg Capsule


300 MG PO TWICE A DAY, #14 CAP


   Prov: CULLEN SHEPHERD MD         9/14/17











CULLEN SHEPHERD MD Sep 14, 2017 16:32

## 2019-07-31 ENCOUNTER — HOSPITAL ENCOUNTER (OUTPATIENT)
Dept: HOSPITAL 75 - RAD | Age: 84
End: 2019-07-31
Attending: INTERNAL MEDICINE
Payer: MEDICARE

## 2019-07-31 DIAGNOSIS — R10.32: Primary | ICD-10-CM

## 2019-07-31 PROCEDURE — 74177 CT ABD & PELVIS W/CONTRAST: CPT

## 2019-07-31 NOTE — DIAGNOSTIC IMAGING REPORT
PROCEDURE: CT abdomen and pelvis with contrast.



TECHNIQUE: Multiple contiguous axial images were obtained through

the abdomen and pelvis after administration of intravenous

contrast. Auto Exposure Controls were utilized during the CT exam

to meet ALARA standards for radiation dose reduction. 



INDICATION: Left lower quadrant pain.



FINDINGS: Comparison is 11/07/2017.



Limited views of the lower thorax reveal aortic valvular

calcifications concerning for aortic stenosis.



Liver is normal. No focal liver lesions are seen. There is

cholelithiasis without cholecystitis. Portal vein is patent.

Pancreas, spleen and adrenal glands are normal.



Kidneys enhance symmetrically without focal lesion. No

hydronephrosis. Urinary bladder is decompressed but appears

thickwalled, correlate for evidence of cystitis. There are no

dilated loops of large or small bowel. There is no bowel

obstruction or inflammation. There is no abdominal or pelvic

lymphadenopathy. The abdominal aorta is atherosclerotic without

aneurysm. No free fluid or air.



There are no suspicious osseous lesions. Compression fracture of

L1, unchanged from prior exam. This results in mild spinal canal

stenosis.



IMPRESSION:

1. Decompressed but thickwalled and mucosally hyperenhancing

urinary bladder concerning for cystitis.



Dictated by: 



  Dictated on workstation # [id number]

## 2020-01-27 ENCOUNTER — HOSPITAL ENCOUNTER (EMERGENCY)
Dept: HOSPITAL 75 - ER | Age: 85
Discharge: HOME | End: 2020-01-27
Payer: MEDICARE

## 2020-01-27 VITALS — DIASTOLIC BLOOD PRESSURE: 82 MMHG | SYSTOLIC BLOOD PRESSURE: 120 MMHG

## 2020-01-27 VITALS — WEIGHT: 78.93 LBS | BODY MASS INDEX: 13.47 KG/M2 | HEIGHT: 63.98 IN

## 2020-01-27 DIAGNOSIS — K59.00: Primary | ICD-10-CM

## 2020-01-27 DIAGNOSIS — F03.90: ICD-10-CM

## 2020-01-27 DIAGNOSIS — K21.9: ICD-10-CM

## 2020-01-27 DIAGNOSIS — Z96.652: ICD-10-CM

## 2020-01-27 DIAGNOSIS — E03.9: ICD-10-CM

## 2020-01-27 DIAGNOSIS — Z90.89: ICD-10-CM

## 2020-01-27 DIAGNOSIS — F32.9: ICD-10-CM

## 2020-01-27 DIAGNOSIS — Z88.1: ICD-10-CM

## 2020-01-27 DIAGNOSIS — Z90.710: ICD-10-CM

## 2020-01-27 DIAGNOSIS — Z87.891: ICD-10-CM

## 2020-01-27 LAB
BASOPHILS # BLD AUTO: 0.1 10^3/UL (ref 0–0.1)
BASOPHILS NFR BLD AUTO: 1 % (ref 0–10)
BLD SMEAR INTERP: YES
BUN/CREAT SERPL: 15
CALCIUM SERPL-MCNC: 9 MG/DL (ref 8.5–10.1)
CHLORIDE SERPL-SCNC: 101 MMOL/L (ref 98–107)
CO2 SERPL-SCNC: 25 MMOL/L (ref 21–32)
CREAT SERPL-MCNC: 1.11 MG/DL (ref 0.6–1.3)
EOSINOPHIL # BLD AUTO: 0.3 10^3/UL (ref 0–0.3)
EOSINOPHIL NFR BLD AUTO: 2 % (ref 0–10)
ERYTHROCYTE [DISTWIDTH] IN BLOOD BY AUTOMATED COUNT: 18.9 % (ref 10–14.5)
GFR SERPLBLD BASED ON 1.73 SQ M-ARVRAT: 46 ML/MIN
GLUCOSE SERPL-MCNC: 100 MG/DL (ref 70–105)
HCT VFR BLD CALC: 33 % (ref 35–52)
HGB BLD-MCNC: 11.4 G/DL (ref 11.5–16)
LYMPHOCYTES # BLD AUTO: 3.3 X 10^3 (ref 1–4)
LYMPHOCYTES NFR BLD AUTO: 27 % (ref 12–44)
MANUAL DIFFERENTIAL PERFORMED BLD QL: NO
MCH RBC QN AUTO: 35 PG (ref 25–34)
MCHC RBC AUTO-ENTMCNC: 34 G/DL (ref 32–36)
MCV RBC AUTO: 102 FL (ref 80–99)
MONOCYTES # BLD AUTO: 1.2 X 10^3 (ref 0–1)
MONOCYTES NFR BLD AUTO: 9 % (ref 0–12)
NEUTROPHILS # BLD AUTO: 7.5 X 10^3 (ref 1.8–7.8)
NEUTROPHILS NFR BLD AUTO: 61 % (ref 42–75)
PLATELET # BLD: 350 10^3/UL (ref 130–400)
PMV BLD AUTO: 10.2 FL (ref 7.4–10.4)
POTASSIUM SERPL-SCNC: 4.2 MMOL/L (ref 3.6–5)
SODIUM SERPL-SCNC: 135 MMOL/L (ref 135–145)
WBC # BLD AUTO: 12.3 10^3/UL (ref 4.3–11)

## 2020-01-27 PROCEDURE — 36415 COLL VENOUS BLD VENIPUNCTURE: CPT

## 2020-01-27 PROCEDURE — 80048 BASIC METABOLIC PNL TOTAL CA: CPT

## 2020-01-27 PROCEDURE — 74022 RADEX COMPL AQT ABD SERIES: CPT

## 2020-01-27 PROCEDURE — 85025 COMPLETE CBC W/AUTO DIFF WBC: CPT

## 2020-01-27 NOTE — ED GI
General


Chief Complaint:  Abdominal/GI Problems


Stated Complaint:  CONSTIPATION


Nursing Triage Note:  


Pt to ED via EMS for "rectal and vaginal blockage."  Pt reports severe pain.  Pt




reports last BM at least a week ago.  Pt reports trying several OTC remedies for




constipation without any relief.  Pt's  reports rectum is swollen and 


"something is hanging out."


Sepsis Screen:  No Definite Risk


Source of Information:  Patient


Exam Limitations:  No Limitations





History of Present Illness


Date Seen by Provider:  Jan 27, 2020


Time Seen by Provider:  14:05


Initial Comments


To ER per EMS from home with reports of rectal and vaginal blockage". She has 

pelvic pain and has been unable to have a bowel movement for 3 days.  no nausea 

or vomiting


Timing/Duration:  1-2 Days


Severity/Quality:  Moderate


Location:  Other (pelvic)


Radiation:  No Radiation


Activities at Onset:  None


Associated Symptoms:  Denies Symptoms





Allergies and Home Medications


Allergies


Coded Allergies:  


     cefdinir (Verified  Allergy, Mild, RASH, 9/16/17)





Home Medications


Acetaminophen 500 Mg Tablet, 1,000 MG PO TID


   Prescribed by: WILL LEHMAN on 11/22/13 1127


Bisacodyl 10 Mg Supp, 10 MG VA DAILY PRN for CONSTIPATION


   Prescribed by: WILL LEHMAN on 11/22/13 1127


Cefdinir 300 Mg Capsule, 300 MG PO TWICE A DAY


   Prescribed by: CULLEN SHEPHERD on 9/14/17 1635


Esomeprazole Mag Trihydrate 40 Mg Capsule.dr, 1 CAP PO DAILY, (Reported)


Hydrocodone/Acetaminophen 1 Each Tablet, 1 EACH PO 4 times a day


   Prescribed by: CULLEN SHEPHERD on 9/14/17 1635


Ibuprofen 600 Mg Tablet, 600 MG PO Q6H PRN for PAIN


   Prescribed by: SVETLANA CULVER on 9/9/16 0149


Levothyroxine Sodium 75 Mcg Tablet, 75 MCG PO DAILY@0630


   Prescribed by: WILL LEHMAN on 11/22/13 1127


Mirabegron 25 Mg Tab.er.24h, 25 MG PO DAILY, (Reported)


Paroxetine Hcl 40 Mg Tablet, 40 MG PO DAILY, (Reported)


Senna 1 Ea Tablet, 1 EA PO BID


   Prescribed by: WILL LEHMAN on 11/22/13 1127


Trazodone Hcl 50 Mg Tablet, 50 MG PO HS


   Prescribed by: WILL LEHMAN on 11/22/13 1127


Trimethoprim 100 Mg Tablet, 100 MG PO HS, (Reported)





Patient Home Medication List


Home Medication List Reviewed:  Yes





Review of Systems


Review of Systems


Constitutional:  see HPI


EENTM:  No Symptoms Reported


Respiratory:  No Symptoms Reported


Cardiovascular:  No Symptoms Reported


Gastrointestinal:  See HPI, Abdominal Pain, Constipated


Genitourinary:  No Symptoms Reported


Musculoskeletal:  no symptoms reported


Skin:  no symptoms reported


Psychiatric/Neurological:  No Symptoms Reported


Endocrine:  No Symptoms Reported





Past Medical-Social-Family Hx


Patient Social History


Alcohol Use:  Denies Use


Recreational Drug Use:  No


Type Used:  Cigarettes


Former Smoker, Quit:  Sep 1, 1982


2nd Hand Smoke Exposure:  No


Recent Foreign Travel:  No


Contact w/Someone Who Travel:  No


Recent Infectious Disease Expo:  No


Recent Hopitalizations:  No





Immunizations Up To Date


Tetanus Booster (TDap):  Unknown


Date of Pneumonia Vaccine:  Nov 1, 2008


Date of Influenza Vaccine:  Oct 1, 2013





Seasonal Allergies


Seasonal Allergies:  No





Past Medical History


Surgeries:  Yes (BLADDER TIE UP, mastectomy x2, l knee)


Bladder Surgery, Breast, Hysterectomy, Orthopedic, Tonsillectomy


Respiratory:  No


Chronic Bronchitis


Currently Using CPAP:  No


Currently Using BIPAP:  No


Cardiac:  No


Neurological:  Yes (PERIPHERHAL NEUROPATHY)


Dementia, Neuropathy


Reproductive Disorders:  No


Female Reproductive Disorders:  Denies


GYN History:  Hysterectomy


Sexually Transmitted Disease:  No


HIV/AIDS:  No


UTI-Chronic


Gastrointestinal:  Yes


Gastroesophageal Reflux


Musculoskeletal:  Yes (LEFT KNEE REPLACEMENT; L1 BURST FRACTURE 2013)


Osteoporosis, Arthritis, Fibromyalgia, Back Injury, Scoliosis


Endocrine:  Yes


Hypothyroidsim


Cancer:  No


Psychosocial:  Yes


Depression


Integumentary:  Yes (ROSACEA)


Blood Disorders:  Yes (ANEMIA)





Physical Exam


Vital Signs





Vital Signs - First Documented








 1/27/20





 12:38


 


Temp 36.7


 


Pulse 90


 


Resp 15


 


B/P (MAP) 123/86 (98)


 


Pulse Ox 99


 


O2 Delivery Room Air





Capillary Refill : Less Than 3 Seconds


Height/Weight/BMI


Height: 5'4.00"


Weight: 120lbs. 8.0oz. [id number]; 13.00 BMI


Method:Stated


General Appearance:  WD/WN, no apparent distress


Neck:  non-tender, full range of motion


Respiratory:  no respiratory distress, no accessory muscle use


Gastrointestinal:  normal bowel sounds, non tender, soft


Rectal:  hemorrhoids (there are some external hemorrhoids none of which are 

bleeding or obviously thrombosed. She does have a large fecal impaction which 

was manually disimpacted)


Extremities:  normal range of motion, non-tender


Neurologic/Psychiatric:  alert, normal mood/affect, oriented x 3


Skin:  normal color, warm/dry





Progress/Results/Core Measures


Results/Orders


Lab Results





Laboratory Tests








Test


 1/27/20


14:20 Range/Units


 


 


White Blood Count


 12.3 H


 4.3-11.0


10^3/uL


 


Red Blood Count


 3.28 L


 4.35-5.85


10^6/uL


 


Hemoglobin 11.4 L 11.5-16.0  G/DL


 


Hematocrit 33 L 35-52  %


 


Mean Corpuscular Volume 102 H 80-99  FL


 


Mean Corpuscular Hemoglobin 35 H 25-34  PG


 


Mean Corpuscular Hemoglobin


Concent 34 


 32-36  G/DL





 


Red Cell Distribution Width 18.9 H 10.0-14.5  %


 


Platelet Count


 350 


 130-400


10^3/uL


 


Mean Platelet Volume 10.2  7.4-10.4  FL


 


Neutrophils (%) (Auto) 61  42-75  %


 


Lymphocytes (%) (Auto) 27  12-44  %


 


Monocytes (%) (Auto) 9  0-12  %


 


Eosinophils (%) (Auto) 2  0-10  %


 


Basophils (%) (Auto) 1  0-10  %


 


Neutrophils # (Auto) 7.5  1.8-7.8  X 10^3


 


Lymphocytes # (Auto) 3.3  1.0-4.0  X 10^3


 


Monocytes # (Auto) 1.2 H 0.0-1.0  X 10^3


 


Eosinophils # (Auto)


 0.3 


 0.0-0.3


10^3/uL


 


Basophils # (Auto)


 0.1 


 0.0-0.1


10^3/uL


 


Sodium Level 135  135-145  MMOL/L


 


Potassium Level 4.2  3.6-5.0  MMOL/L


 


Chloride Level 101    MMOL/L


 


Carbon Dioxide Level 25  21-32  MMOL/L


 


Anion Gap 9  5-14  MMOL/L


 


Blood Urea Nitrogen 17  7-18  MG/DL


 


Creatinine


 1.11 


 0.60-1.30


MG/DL


 


Estimat Glomerular Filtration


Rate 46 


  





 


BUN/Creatinine Ratio 15   


 


Glucose Level 100    MG/DL


 


Calcium Level 9.0  8.5-10.1  MG/DL


 


Smear Scan YES   








My Orders





Orders - ANDRES JACQUES


Ua Culture If Indicated (1/27/20 12:23)


Cbc With Automated Diff (1/27/20 13:54)


Basic Metabolic Panel (1/27/20 13:54)


Acute Abd Series (1/27/20 13:54)


Na Phos/Na Biphos Enema (Fleet Enema Brian (1/27/20 14:15)


Acetaminophen Tablet/Caplet (Tylenol  T (1/27/20 14:30)


Ibuprofen  Tablet (Motrin  Tablet) (1/27/20 14:30)


Lidocaine 2% (Urojet) (Xylocaine Urojet) (1/27/20 15:45)


Soap Suds Enema (1/27/20 15:55)


Magnesium Citrate Oral Soln (Citrate Of (1/27/20 16:00)





Medications Given in ED





Current Medications








 Medications  Dose


 Ordered  Sig/Nakul


 Route  Start Time


 Stop Time Status Last Admin


Dose Admin


 


 Acetaminophen  650 mg  ONCE  ONCE


 PO  1/27/20 14:30


 1/27/20 14:31 DC 1/27/20 14:51


650 MG


 


 Ibuprofen  600 mg  ONCE  ONCE


 PO  1/27/20 14:30


 1/27/20 14:31 DC 1/27/20 14:53


600 MG


 


 Lidocaine HCl  10 ml  ONCE  ONCE


 TOP  1/27/20 15:45


 1/27/20 15:46 DC 1/27/20 15:41


10 ML


 


 Magnesium Citrate  150 ml  ONCE  ONCE


 PO  1/27/20 16:00


 1/27/20 16:01 DC 1/27/20 16:10


150 ML


 


 Sodium


 Biphosphate/


 Sodium Phosphate  1 ea  ONCE  ONCE


 VA  1/27/20 14:15


 1/27/20 14:16 DC 1/27/20 15:30


1 EA








Vital Signs/I&O











 1/27/20





 12:38


 


Temp 36.7


 


Pulse 90


 


Resp 15


 


B/P (MAP) 123/86 (98)


 


Pulse Ox 99


 


O2 Delivery Room Air














Blood Pressure Mean:                    98











Departure


Impression





   Primary Impression:  


   Constipation


   Qualified Codes:  K59.00 - Constipation, unspecified


Disposition:  01 HOME, SELF-CARE


Condition:  Stable





Departure-Patient Inst.


Decision time for Depature:  17:14


Referrals:  


GUY VILLELA MD (PCP/Family)


Primary Care Physician


Patient Instructions:  Constipation, Adult (DC)





Add. Discharge Instructions:  


1. Take the MiraLAX, one capful dissolved in a glass of water twice a day





All discharge instructions reviewed with patient and/or family. Voiced 

understanding.


Scripts


Polyethylene Glycol 3350 (Miralax) 17 Gm Powd.pack


17 GM PO BID, #20 EACH


   Prov: ANDRES JACQUES         1/27/20











ANDRES JACQUES             Jan 27, 2020 14:06

## 2020-01-27 NOTE — DIAGNOSTIC IMAGING REPORT
INDICATION: Abdominal pain, history of rectal blockage.



Abdominal series performed with a frontal chest radiograph and

supine and upright abdominal films.



Single view of the chest also performed demonstrates no focal

infiltrate, pneumothorax, or pleural fluid. There is no free

intraperitoneal air. Abdominal bowel gas pattern shows no overt

obstruction or ileus. There is moderate stool throughout the

colon. There are calcifications over the right upper quadrant,

which are likely gallstones.



IMPRESSION: No sign of free air or bowel obstruction. Right upper

quadrant calcifications are present which are probably

gallstones, correlate with sonography if there is symptomatology

in this location.



Dictated by: 



  Dictated on workstation # [id number]

## 2020-02-19 ENCOUNTER — HOSPITAL ENCOUNTER (INPATIENT)
Dept: HOSPITAL 75 - ER | Age: 85
LOS: 5 days | Discharge: NURSING, REHAB OR LONG-TERM CARE | DRG: 690 | End: 2020-02-24
Attending: INTERNAL MEDICINE | Admitting: INTERNAL MEDICINE
Payer: MEDICARE

## 2020-02-19 VITALS — DIASTOLIC BLOOD PRESSURE: 75 MMHG | SYSTOLIC BLOOD PRESSURE: 129 MMHG

## 2020-02-19 VITALS — WEIGHT: 103.18 LBS | HEIGHT: 59.84 IN | BODY MASS INDEX: 20.26 KG/M2

## 2020-02-19 VITALS — SYSTOLIC BLOOD PRESSURE: 124 MMHG | DIASTOLIC BLOOD PRESSURE: 55 MMHG

## 2020-02-19 VITALS — SYSTOLIC BLOOD PRESSURE: 138 MMHG | DIASTOLIC BLOOD PRESSURE: 63 MMHG

## 2020-02-19 DIAGNOSIS — R47.01: ICD-10-CM

## 2020-02-19 DIAGNOSIS — F32.9: ICD-10-CM

## 2020-02-19 DIAGNOSIS — R29.701: ICD-10-CM

## 2020-02-19 DIAGNOSIS — E03.9: ICD-10-CM

## 2020-02-19 DIAGNOSIS — N30.01: Primary | ICD-10-CM

## 2020-02-19 DIAGNOSIS — M79.7: ICD-10-CM

## 2020-02-19 DIAGNOSIS — Z85.3: ICD-10-CM

## 2020-02-19 DIAGNOSIS — R13.12: ICD-10-CM

## 2020-02-19 DIAGNOSIS — G45.9: ICD-10-CM

## 2020-02-19 DIAGNOSIS — Z66: ICD-10-CM

## 2020-02-19 DIAGNOSIS — R53.81: ICD-10-CM

## 2020-02-19 DIAGNOSIS — Z96.652: ICD-10-CM

## 2020-02-19 DIAGNOSIS — K21.9: ICD-10-CM

## 2020-02-19 DIAGNOSIS — M81.0: ICD-10-CM

## 2020-02-19 DIAGNOSIS — M41.9: ICD-10-CM

## 2020-02-19 DIAGNOSIS — B96.1: ICD-10-CM

## 2020-02-19 DIAGNOSIS — F03.90: ICD-10-CM

## 2020-02-19 DIAGNOSIS — G93.40: ICD-10-CM

## 2020-02-19 DIAGNOSIS — G62.9: ICD-10-CM

## 2020-02-19 DIAGNOSIS — M19.91: ICD-10-CM

## 2020-02-19 DIAGNOSIS — Z87.891: ICD-10-CM

## 2020-02-19 LAB
ALBUMIN SERPL-MCNC: 3.8 GM/DL (ref 3.2–4.5)
ALP SERPL-CCNC: 77 U/L (ref 40–136)
ALT SERPL-CCNC: 18 U/L (ref 0–55)
APTT BLD: 22 SEC (ref 24–35)
APTT PPP: YELLOW S
BACTERIA #/AREA URNS HPF: (no result) /HPF
BASOPHILS # BLD AUTO: 0.1 10^3/UL (ref 0–0.1)
BASOPHILS NFR BLD AUTO: 0 % (ref 0–10)
BILIRUB SERPL-MCNC: 0.7 MG/DL (ref 0.1–1)
BILIRUB UR QL STRIP: NEGATIVE
BLD SMEAR INTERP: YES
BUN/CREAT SERPL: 21
CALCIUM SERPL-MCNC: 9.1 MG/DL (ref 8.5–10.1)
CHLORIDE SERPL-SCNC: 103 MMOL/L (ref 98–107)
CO2 SERPL-SCNC: 21 MMOL/L (ref 21–32)
CREAT SERPL-MCNC: 1.08 MG/DL (ref 0.6–1.3)
D DIMER PPP FEU-MCNC: 0.37 UG/ML (ref 0–0.49)
EOSINOPHIL # BLD AUTO: 0.5 10^3/UL (ref 0–0.3)
EOSINOPHIL NFR BLD AUTO: 4 % (ref 0–10)
ERYTHROCYTE [DISTWIDTH] IN BLOOD BY AUTOMATED COUNT: 19.4 % (ref 10–14.5)
FIBRINOGEN PPP-MCNC: (no result) MG/DL
GFR SERPLBLD BASED ON 1.73 SQ M-ARVRAT: 48 ML/MIN
GLUCOSE SERPL-MCNC: 104 MG/DL (ref 70–105)
GLUCOSE UR STRIP-MCNC: NEGATIVE MG/DL
HCT VFR BLD CALC: 34 % (ref 35–52)
HGB BLD-MCNC: 11.5 G/DL (ref 11.5–16)
INR PPP: 1.1 (ref 0.8–1.4)
KETONES UR QL STRIP: NEGATIVE
LEUKOCYTE ESTERASE UR QL STRIP: (no result)
LYMPHOCYTES # BLD AUTO: 2.9 X 10^3 (ref 1–4)
LYMPHOCYTES NFR BLD AUTO: 25 % (ref 12–44)
MANUAL DIFFERENTIAL PERFORMED BLD QL: NO
MCH RBC QN AUTO: 35 PG (ref 25–34)
MCHC RBC AUTO-ENTMCNC: 34 G/DL (ref 32–36)
MCV RBC AUTO: 103 FL (ref 80–99)
MONOCYTES # BLD AUTO: 1.1 X 10^3 (ref 0–1)
MONOCYTES NFR BLD AUTO: 10 % (ref 0–12)
NEUTROPHILS # BLD AUTO: 6.9 X 10^3 (ref 1.8–7.8)
NEUTROPHILS NFR BLD AUTO: 61 % (ref 42–75)
NITRITE UR QL STRIP: POSITIVE
PH UR STRIP: 6 [PH] (ref 5–9)
PLATELET # BLD: 264 10^3/UL (ref 130–400)
PMV BLD AUTO: 11.2 FL (ref 7.4–10.4)
POTASSIUM SERPL-SCNC: 5 MMOL/L (ref 3.6–5)
PROT SERPL-MCNC: 7.9 GM/DL (ref 6.4–8.2)
PROT UR QL STRIP: (no result)
PROTHROMBIN TIME: 14.4 SEC (ref 12.2–14.7)
RBC #/AREA URNS HPF: (no result) /HPF
SODIUM SERPL-SCNC: 136 MMOL/L (ref 135–145)
SP GR UR STRIP: 1.01 (ref 1.02–1.02)
WBC # BLD AUTO: 11.4 10^3/UL (ref 4.3–11)
WBC #/AREA URNS HPF: (no result) /HPF

## 2020-02-19 PROCEDURE — 71045 X-RAY EXAM CHEST 1 VIEW: CPT

## 2020-02-19 PROCEDURE — 85379 FIBRIN DEGRADATION QUANT: CPT

## 2020-02-19 PROCEDURE — 70551 MRI BRAIN STEM W/O DYE: CPT

## 2020-02-19 PROCEDURE — 80053 COMPREHEN METABOLIC PANEL: CPT

## 2020-02-19 PROCEDURE — 93005 ELECTROCARDIOGRAM TRACING: CPT

## 2020-02-19 PROCEDURE — 87186 SC STD MICRODIL/AGAR DIL: CPT

## 2020-02-19 PROCEDURE — 51702 INSERT TEMP BLADDER CATH: CPT

## 2020-02-19 PROCEDURE — 70498 CT ANGIOGRAPHY NECK: CPT

## 2020-02-19 PROCEDURE — 94664 DEMO&/EVAL PT USE INHALER: CPT

## 2020-02-19 PROCEDURE — 81000 URINALYSIS NONAUTO W/SCOPE: CPT

## 2020-02-19 PROCEDURE — 82962 GLUCOSE BLOOD TEST: CPT

## 2020-02-19 PROCEDURE — 85730 THROMBOPLASTIN TIME PARTIAL: CPT

## 2020-02-19 PROCEDURE — 84484 ASSAY OF TROPONIN QUANT: CPT

## 2020-02-19 PROCEDURE — 93041 RHYTHM ECG TRACING: CPT

## 2020-02-19 PROCEDURE — 36415 COLL VENOUS BLD VENIPUNCTURE: CPT

## 2020-02-19 PROCEDURE — 70450 CT HEAD/BRAIN W/O DYE: CPT

## 2020-02-19 PROCEDURE — 87077 CULTURE AEROBIC IDENTIFY: CPT

## 2020-02-19 PROCEDURE — 87088 URINE BACTERIA CULTURE: CPT

## 2020-02-19 PROCEDURE — 70496 CT ANGIOGRAPHY HEAD: CPT

## 2020-02-19 PROCEDURE — 80061 LIPID PANEL: CPT

## 2020-02-19 PROCEDURE — 85610 PROTHROMBIN TIME: CPT

## 2020-02-19 PROCEDURE — 85025 COMPLETE CBC W/AUTO DIFF WBC: CPT

## 2020-02-19 RX ADMIN — SODIUM CHLORIDE SCH MLS/HR: 900 INJECTION, SOLUTION INTRAVENOUS at 15:40

## 2020-02-19 RX ADMIN — DOCUSATE SODIUM SCH MG: 100 CAPSULE ORAL at 20:40

## 2020-02-19 NOTE — NUR
SPOKE WITH PT'S  AS WELL AS GOING THRU THE EXT MED HISTORY TO COMPLETE THE MED REC



PT'S  (CHRISTI) TOLD ME THAT SHE HASNT TAKEN ANY OF HER MEDS IN 6-8 MONTHS. HE 
INDICATED THAT THEY USE [facility] AND THEY KEEP SENDING THEM BUT PT WONT TAKE 
THEM. THE ONLY THING HE CAN GET HER TO TAKE IT TYLENOL, ADVIL OR TRAMADOL.



I TOOK EVERYTHING OFF THE MED REC EXCEPT THOSE 3 MEDICATIONS.

## 2020-02-19 NOTE — DIAGNOSTIC IMAGING REPORT
Indication: Respiratory infection



Portable chest 12:19 PM



Heart size and pulmonary vascularity are normal. Lungs are clear.

There are no effusions or pneumothoraces.



IMPRESSION: Negative chest



Dictated by: 



  Dictated on workstation # [id number]

## 2020-02-19 NOTE — NUR
KATY GARCIA admitted to room 419-1, with an admitting diagnosis of UTI, on 02/19/20 from 
ER, accompanied by .KATY GARCIA introduced to surroundings, call light, bed 
controls, phone, TV, temperature control, lights, meal times, smoking policy, visitor 
policy, side rail policy, bathrooms and showers.  Patient Rights given to patient in the 
handbook. KATY GARCIA verbalizes understanding that [facility] is not responsible for 
the loss or damage to any personal effects or valuables that are kept in the patients 
posession during their hospitalization.  The following Patient Care Plans were discussed 
with the PATIENT AND : Discharge Planning, INFECTION CONROL,NUTRION, and 
TELEMONITORING. KATY GARCIA verbalizes understanding of Interdisciplinary Patient 
Education.

## 2020-02-19 NOTE — DIAGNOSTIC IMAGING REPORT
PROCEDURE: CT head wo r/o stroke.



TECHNIQUE: Multiple contiguous axial images were obtained through

the brain without the use of intravenous contrast. Auto Exposure

Controls were utilized during the CT exam to meet ALARA standards

for radiation dose reduction. 



INDICATION: Altered mental status and aphasia.



COMPARISON: Comparison made with prior examination from

11/07/2013.



FINDINGS: There is prominence of the ventricles and sulci. There

is no hydrocephalus or cerebral edema. There is no midline shift

or mass-effect. There is no intracranial mass, hemorrhage, or

extra-axial fluid collection. There is some diffuse decreased

attenuation of the periventricular white matter which is

nonspecific. The visualized paranasal sinuses and mastoid air

cells are clear. There are no regional areas of decreased

attenuation appreciated to suggest an acute CVA.



IMPRESSION: 

1. No acute intracranial process.

2. Age-appropriate atrophy.

3. Decreased attenuation of the periventricular white matter

which is nonspecific, however, likely reflects senescent change

and/or chronic small vessel ischemic disease. If there is high

clinical concern for an acute CVA, further evaluation with MRI

should be considered.



Dictated by: 



  Dictated on workstation # [id number]

## 2020-02-19 NOTE — OCC THERAPY PROGRESS NOTE
Therapy Progress Note


OT order received, chart reviewed.  Pt. came to ER this a.m. with stroke like 

symptoms.  Pt. transferred to 4th floor medical.  Pt. undergoing nursing 

assessment and pt/family interview.  Attempted x 2 and will attempt back as time

allows.  Otherwise, will allow pt. to rest and will begin in a.m.





1410, 1430


1,1,visit











EFRAIN ELLER OT           Feb 19, 2020 14:48

## 2020-02-19 NOTE — ED NEUROLOGICAL PROBLEM
General


Chief Complaint:  Neuro-Stroke Like Symptoms


Stated Complaint:  STROKE SYMPTOMS


Nursing Triage Note:  


ARRIVED VIA [facility] FROM HOME.  PT HAD TROUBLE SPEAKING AT APPX 1000 THIS AM.  


EMS REPORTS PT NOT BEING ABLE TO TALK BUT NO OTHER DEFICITS.  UPON PT ARRIVAL PT




TALKING WITHOUT DIFFICULTY.  A/0 X3


Nursing Sepsis Screen:  No Definite Risk


Source:  patient, EMS


Exam Limitations:  no limitations





History of Present Illness


Date Seen by Provider:  2020


Time Seen by Provider:  10:54


Initial Comments


The patient presents to the ER by EMS from home where she lives with her 

independently and is usually ambulatory and fully verbal. She has chief 

complaint that she was sleeping in until about 10:00 this morning and then she 

got up called out to her family loudly and when they went to check on her she 

was unable to speak. She's not having any weakness or numbness or pain. EMS 

reports she had equal strength in all 4 extremities but could not answer 

questions. EMS was familiar with the patient and says that at baseline she is 

able to have normal conversations. She doesn't history of being ran for UTIs. 

Patient denies dysuria cough shortness of breath. She is able to answer 

questions and is oriented to person and place as well as situation. She denies 

any pain fever chills nausea or diarrhea. She denies a history of coronary 

disease or stroke. She denies a history of smoking but had a past history of 

smoking. Last known well time per family was 1000.


After the  arrives he gives further history that for the past 6 months 

she has not been taking any of the medications prescribed to her. She has 

frequent, chronic pain in her bladder and is had a bladder sling by Dr. Tawil in

the past. She has frequent bladder infections. He gives her Tylenol, Advil and 

occasionally tramadol with the last dose being half a tablet last night before 

bedtime. No history of stroke seizure heart attack. Presently she is not taking 

any routine medications.





Allergies and Home Medications


Allergies


Coded Allergies:  


     cefdinir (Verified  Allergy, Mild, RASH, 17)





Home Medications


Acetaminophen 500 Mg Tablet, 1,000 MG PO TID


   Prescribed by: WILL LEHMAN on 13 1127


Bisacodyl 10 Mg Supp, 10 MG [place] DAILY PRN for CONSTIPATION


   Prescribed by: WILL LEHMAN on 13 1127


Cefdinir 300 Mg Capsule, 300 MG PO TWICE A DAY


   Prescribed by: CULLEN SHEPHERD on 17 1635


Esomeprazole Mag Trihydrate 40 Mg Capsule.dr, 1 CAP PO DAILY, (Reported)


Hydrocodone/Acetaminophen 1 Each Tablet, 1 EACH PO 4 times a day


   Prescribed by: CULLEN SHEPHERD on 17 1635


Ibuprofen 600 Mg Tablet, 600 MG PO Q6H PRN for PAIN


   Prescribed by: SVETLANA CULVER on 16 0149


Levothyroxine Sodium 75 Mcg Tablet, 75 MCG PO DAILY@0630


   Prescribed by: WILL LEHMAN on 13 1127


Mirabegron 25 Mg Tab.er.24h, 25 MG PO DAILY, (Reported)


Paroxetine Hcl 40 Mg Tablet, 40 MG PO DAILY, (Reported)


Polyethylene Glycol 3350 17 Gm Powd.pack, 17 GM PO BID


   Prescribed by: ANDRES JACQUES on 20 1715


Senna 1 Ea Tablet, 1 EA PO BID


   Prescribed by: WILL LEHMAN on 13 1127


Trazodone Hcl 50 Mg Tablet, 50 MG PO HS


   Prescribed by: WILL LEHMAN on 13 1127


Trimethoprim 100 Mg Tablet, 100 MG PO HS, (Reported)





Patient Home Medication List


Home Medication List Reviewed:  Yes





Review of Systems


Review of Systems


Constitutional:  No chills, No diaphoresis, No fever


Eyes:  Denies Blindness, Denies Drainage


Ears, Nose, Mouth, Throat:  denies ear pain, denies ear discharge


Respiratory:  No cough, No short of breath


Cardiovascular:  No chest pain, No edema


Gastrointestinal:  No abdominal pain, No nausea, No vomiting


Genitourinary:  No discharge, No dysuria, No frequency


Pregnant:  No


Musculoskeletal:  No back pain, No joint pain


Skin:  No pruritus, No rash


Psychiatric/Neurological:  See HPI; Denies Headache, Denies Numbness, Denies 

Petit Mal Seizures, Denies Tonic Clonic Seizures, Denies Weakness


Endocrine:  Denies Excessive Sweating, Denies Flushing





Past Medical-Social-Family Hx


Patient Social History


Alcohol Use:  Denies Use


Recreational Drug Use:  No


Smoking Status:  Former Smoker


Type Used:  Cigarettes


Former Smoker, Quit:  Sep 1, 1982


2nd Hand Smoke Exposure:  No


Recent Foreign Travel:  No


Contact w/Someone Who Travel:  No


Recent Infectious Disease Expo:  No


Recent Hopitalizations:  No





Immunizations Up To Date


Tetanus Booster (TDap):  Unknown


Date of Pneumonia Vaccine:  2008


Date of Influenza Vaccine:  Oct 1, 2013





Seasonal Allergies


Seasonal Allergies:  No





Past Medical History


Surgeries:  Yes (BLADDER TIE UP, mastectomy x2, l knee)


Bladder Surgery, Breast, Hysterectomy, Orthopedic, Tonsillectomy


Respiratory:  No


Chronic Bronchitis


Currently Using CPAP:  No


Currently Using BIPAP:  No


Cardiac:  No


Neurological:  Yes (PERIPHERHAL NEUROPATHY)


Dementia, Neuropathy


Reproductive Disorders:  No


Female Reproductive Disorders:  Denies


GYN History:  Hysterectomy


Sexually Transmitted Disease:  No


HIV/AIDS:  No


UTI-Chronic


Gastrointestinal:  Yes


Gastroesophageal Reflux


Musculoskeletal:  Yes (LEFT KNEE REPLACEMENT; L1 BURST FRACTURE )


Osteoporosis, Arthritis, Fibromyalgia, Back Injury, Scoliosis


Endocrine:  Yes


Hypothyroidsim


Cancer:  No


Psychosocial:  Yes


Depression


Integumentary:  Yes (ROSACEA)


Blood Disorders:  Yes (ANEMIA)





Physical Exam


Vital Signs





Vital Signs - First Documented








 20





 10:54


 


Temp 36.9


 


Pulse 88


 


Resp 16


 


B/P (MAP) 110/54 (72)


 


Pulse Ox 97


 


O2 Delivery Room Air





Capillary Refill : Less Than 3 Seconds


Height, Weight, BMI


Height: 5'4.00"


Weight: 120lbs. 8.0oz. [id number]; 20.00 BMI


Method:Stated


General Appearance:  WD/WN, no apparent distress


HEENT:  PERRL/EOMI, normal ENT inspection, TMs normal, pharynx normal


Neck:  non-tender, full range of motion, supple, normal inspection


Respiratory:  lungs clear, normal breath sounds, no respiratory distress, no 

accessory muscle use


Cardiovascular:  normal peripheral pulses, regular rate, rhythm, no edema, no 

murmur


Peripheral Pulses:  2+ Dorsalis Pedis (R), 2+ Left Dors-Pedis (L), 2+ Radial 

Pulses (R), 2+ Radial Pulses (L)


Gastrointestinal:  normal bowel sounds, non tender, soft


Extremities:  normal range of motion, non-tender, normal inspection, no pedal 

edema, normal capillary refill


Neurologic/Psychiatric:  CNs II-XII nml as tested, no motor/sensory deficits, 

alert, normal mood/affect, oriented x 3, other (the patient is not verbose but 

she answers questions appropriately and is maybe a little halting and sparing 

with her speech)


Crainal Nerves:  normal hearing, normal speech, PERRL


Motor/Sensory:  no motor deficit, no sensory deficit, no pronator drift


Skin:  normal color, warm/dry





Stroke


Onset of Symptoms


Date of Onset of Symptoms:  2020


Time of Symptom Onset:  10:00


Onset of Symptoms:  Yes


Symptoms onset unknown:  No





NIH Stroke Scale Assessment





   Select: Initial Level of Consciousness: 0=Alert (0), Level of Consciousness-

   Questions: 0=Answers both month/age (0), LOC Commands: 0=Performs both tasks 

   (0), Gaze: Normal (0), Visual Fields: 0=No visual loss (0), Facial Movement 

   (Facial Paresis): 0=Normal symmetrical mnt (0), Motor Function-Arms Right: 

   0=No drift (0), Motor Function-Arms Left: 0=No drift (0), Motor Function-Legs

   Right: 0=No drift (0), Motor Function-Legs Left: 0=No drift (0), Limb Ataxia:

   0=Absent (0), Sensory: 0=Normal:no loss (0), Best Language: 1=Mild to moderat

   aphasia (1), Dysarthria: 0=Normal (0), Extinction & Inattention: 0=No 

   abnormality (0), Total: 1





Focused Exam


Sepsis Stage:  Ruled Out


Reason for ruling out sepsis:  not enough criteria


Possible Source:  Pulmonary





Progress/Results/Core Measures


Results/Orders


Lab Results





Laboratory Tests








Test


 20


10:46 20


10:58 20


11:25 Range/Units


 


 


White Blood Count


 11.4 H


 


 


 4.3-11.0


10^3/uL


 


Red Blood Count


 3.27 L


 


 


 4.35-5.85


10^6/uL


 


Hemoglobin 11.5    11.5-16.0  G/DL


 


Hematocrit 34 L   35-52  %


 


Mean Corpuscular Volume 103 H   80-99  FL


 


Mean Corpuscular Hemoglobin 35 H   25-34  PG


 


Mean Corpuscular Hemoglobin


Concent 34 


 


 


 32-36  G/DL





 


Red Cell Distribution Width 19.4 H   10.0-14.5  %


 


Platelet Count


 264 


 


 


 130-400


10^3/uL


 


Mean Platelet Volume 11.2 H   7.4-10.4  FL


 


Neutrophils (%) (Auto) 61    42-75  %


 


Lymphocytes (%) (Auto) 25    12-44  %


 


Monocytes (%) (Auto) 10    0-12  %


 


Eosinophils (%) (Auto) 4    0-10  %


 


Basophils (%) (Auto) 0    0-10  %


 


Neutrophils # (Auto) 6.9    1.8-7.8  X 10^3


 


Lymphocytes # (Auto) 2.9    1.0-4.0  X 10^3


 


Monocytes # (Auto) 1.1 H   0.0-1.0  X 10^3


 


Eosinophils # (Auto)


 0.5 H


 


 


 0.0-0.3


10^3/uL


 


Basophils # (Auto)


 0.1 


 


 


 0.0-0.1


10^3/uL


 


Prothrombin Time 14.4    12.2-14.7  SEC


 


INR Comment 1.1    0.8-1.4  


 


Activated Partial


Thromboplast Time 22 L


 


 


 24-35  SEC





 


D-Dimer


 0.37 


 


 


 0.00-0.49


UG/ML


 


Sodium Level 136    135-145  MMOL/L


 


Potassium Level 5.0    3.6-5.0  MMOL/L


 


Chloride Level 103      MMOL/L


 


Carbon Dioxide Level 21    21-32  MMOL/L


 


Anion Gap 12    5-14  MMOL/L


 


Blood Urea Nitrogen 23 H   7-18  MG/DL


 


Creatinine


 1.08 


 


 


 0.60-1.30


MG/DL


 


Estimat Glomerular Filtration


Rate 48 


 


 


  





 


BUN/Creatinine Ratio 21     


 


Glucose Level 104      MG/DL


 


Calcium Level 9.1    8.5-10.1  MG/DL


 


Corrected Calcium 9.3    8.5-10.1  MG/DL


 


Total Bilirubin 0.7    0.1-1.0  MG/DL


 


Aspartate Amino Transf


(AST/SGOT) 32 


 


 


 5-34  U/L





 


Alanine Aminotransferase


(ALT/SGPT) 18 


 


 


 0-55  U/L





 


Alkaline Phosphatase 77      U/L


 


Troponin I < 0.028    <0.028  NG/ML


 


Total Protein 7.9    6.4-8.2  GM/DL


 


Albumin 3.8    3.2-4.5  GM/DL


 


Smear Scan YES     


 


Glucometer  103     MG/DL


 


Urine Color   YELLOW   


 


Urine Clarity   CLOUDY   


 


Urine pH   6.0  5-9  


 


Urine Specific Gravity   1.015 L 1.016-1.022  


 


Urine Protein   2+ H NEGATIVE  


 


Urine Glucose (UA)   NEGATIVE  NEGATIVE  


 


Urine Ketones   NEGATIVE  NEGATIVE  


 


Urine Nitrite   POSITIVE H NEGATIVE  


 


Urine Bilirubin   NEGATIVE  NEGATIVE  


 


Urine Urobilinogen   0.2  < = 1.0  MG/DL


 


Urine Leukocyte Esterase   2+ H NEGATIVE  


 


Urine RBC (Auto)   3+ H NEGATIVE  


 


Urine RBC    H  /HPF


 


Urine WBC   TNTC H  /HPF


 


Urine Crystals   NONE   /LPF


 


Urine Bacteria   MODERATE H  /HPF


 


Urine Casts   NONE   /LPF


 


Urine Mucus   NEGATIVE   /LPF


 


Urine Culture Indicated   YES   








My Orders





Orders - ANA HUGGINS


Cbc With Automated Diff (20 11:00)


Protime With Inr (20 11:00)


Partial Thromboplastin Time (20 11:00)


Comprehensive Metabolic Panel (20 11:00)


Fibrin Degradation Products (20 11:00)


Troponin I (20 11:00)


Ua Culture If Indicated (20 11:00)


Chest 1 View, Ap/Pa Only (20 11:00)


Catheter(Urinary) Insert & Ass 03,15 (20 11:00)


Ekg Tracing (20 11:00)


Nothing By Mouth (20 Dinner)


Accucheck Stat ONCE (20 11:00)


Ed Iv/Invasive Line Start (20 11:00)


Ed Iv/Invasive Line Start (20 11:00)


Vital Signs Stroke Patient Q15M (20 11:00)


Ct Head Wo-R/O Stroke (20 11:00)


O2 (20 11:00)


Intake & Output 06,14,22 (20 11:00)


Monitor-Rhythm Ecg Trace Only (20 11:00)


Dysphagia Screening Tool (20 11:00)


Lipid Panel (20 06:00)


Ct Angio Head/Neck (20 11:00)


Iohexol Injection (Omnipaque 350 Mg/Ml 1 (20 11:15)


Received Contrast (Hold Metformin- Contr (20 11:15)


Ns (Ivpb) (Sodium Chloride 0.9% Ivpb Bag (20 11:15)


Received Contrast (Hold Metformin- Contr (20 11:30)


Urine Culture (20 11:25)


Fentanyl  Injection (Sublimaze Injection (20 12:15)


Ciprofloxacin Iv 400mg/200ml (Cipro Iv S (20 12:15)





Medications Given in ED





Current Medications








 Medications  Dose


 Ordered  Sig/Nakul


 Route  Start Time


 Stop Time Status Last Admin


Dose Admin


 


 Ciprofloxacin/


 Dextrose  200 ml @ 


 200 mls/hr  ONCE  ONCE


 IV  20 12:15


 20 13:14  20 12:21


200 MLS/HR


 


 Fentanyl Citrate  25 mcg  ONCE  ONCE


 IVP  20 12:15


 20 12:16 DC 20 12:16


25 MCG


 


 Iohexol  75 ml  ONCE  ONCE


 IV  20 11:15


 20 11:16 DC 20 11:21


75 ML


 


 Sodium Chloride  100 ml  ONCE  ONCE


 IV  20 11:15


 20 11:16 DC 20 11:21


80 ML








Vital Signs/I&O











 20





 10:54


 


Temp 36.9


 


Pulse 88


 


Resp 16


 


B/P (MAP) 110/54 (72)


 


Pulse Ox 97


 


O2 Delivery Room Air














Blood Pressure Mean:                    72











Progress


Progress Note #1:  


   Time:  11:29


Progress Note


She is not diabetic but her blood sugar is normal per EMS. CT of the head and CT

angiogram. Perhaps she had a seizure and she was postictal? Infection is also 

possible so we'll obtain urine and lab.


Progress Note #2:  


   Time:  12:07


Progress Note


The patient appears to be back to baseline per family. NIH be 0 point this time.

We will not be pursuing TPA but we will pursue a TIA workup. She is having pain 

in her bladder which is chronic for her several start with 25 g of fentanyl IV.


Initial ECG Impression Date:  2020


Initial ECG Impression Time:  11:00


Initial ECG Rate:  84


Initial ECG Intervals:  QT (459)


Initial ECG Impression:  Nonspecific Changes


Comment


Accelerated junctional rhythm without significant ST elevation or depression.





Diagnostic Imaging





   Diagonstic Imaging:  Xray


   Plain Films/CT/US/NM/MRI:  chest (1v)


Comments


NAME:   KATY GARCIA


MED REC#:   [id number]


ACCOUNT#:   [id number]


PT STATUS:   REG ER


:   1932


PHYSICIAN:   ANA HUGGINS MD


ADMIT DATE:   20/ER


                                  ***Signed***


Date of Exam:20





CHEST 1 VIEW, AP/PA ONLY





Indication: Respiratory infection





Portable chest 12:19 PM





Heart size and pulmonary vascularity are normal. Lungs are clear.


There are no effusions or pneumothoraces.





IMPRESSION: Negative chest





Dictated by: 





  Dictated on workstation # [id number]





Dict:   20 1227


Trans:   20 1228


[id number]





Interpreted by:     CYNDEE FERNÁNDEZ MD


Electronically signed by: CYNDEE FERNÁNDEZ 


   Reviewed:  Reviewed by Me








   Diagonstic Imaging:  CT


   Plain Films/CT/US/NM/MRI:  head


Comments


                 [facility] [place], [place]





NAME:   KATY GARCIA


[facility] REC#:   [id number]


ACCOUNT#:   [id number]


PT STATUS:   REG ER


:   1932


PHYSICIAN:   ANA HUGGINS MD


ADMIT DATE:   20/ER


                                   ***Draft***


Date of Exam:20





CT HEAD WO-R/O STROKE





PROCEDURE: CT head wo r/o stroke.





TECHNIQUE: Multiple contiguous axial images were obtained through


the brain without the use of intravenous contrast. Auto Exposure


Controls were utilized during the CT exam to meet ALARA standards


for radiation dose reduction. 





INDICATION: Altered mental status and aphasia.





COMPARISON: Comparison made with prior examination from


2013.





FINDINGS: There is prominence of the ventricles and sulci. There


is no hydrocephalus or cerebral edema. There is no midline shift


or mass-effect. There is no intracranial mass, hemorrhage, or


extra-axial fluid collection. There is some diffuse decreased


attenuation of the periventricular white matter which is


nonspecific. The visualized paranasal sinuses and mastoid air


cells are clear. There are no regional areas of decreased


attenuation appreciated to suggest an acute CVA.





IMPRESSION: 


1. No acute intracranial process.


2. Age-appropriate atrophy.


3. Decreased attenuation of the periventricular white matter


which is nonspecific, however, likely reflects senescent change


and/or chronic small vessel ischemic disease. If there is high


clinical concern for an acute CVA, further evaluation with MRI


should be considered.





  Dictated on workstation # [id number]





Dict:   20 1116


Trans:   20 1117


 [id number]





Interpreted by:     JANNETTE CASTREJON MD


Electronically signed by:


   Reviewed:  Reviewed by Me








   Diagonstic Imaging:  CT (angiogram)


   Plain Films/CT/US/NM/MRI:  head (head and neck)


Comments


NAME:   KATY GRACIA


[facility] REC#:   [id number]


ACCOUNT#:   [id number]


PT STATUS:   REG ER


:   1932


PHYSICIAN:   ANA HUGGINS MD


ADMIT DATE:   20/ER


                                   ***Draft***


Date of Exam:20





CT ANGIO HEAD/NECK





PROCEDURE: CT angiography of the head and CT angiography of the


neck with and without contrast.





TECHNIQUE: Contiguous noncontrast images were obtained from the


skull base through the vertex. After intravenous contrast


administration, helical CT angiography of the neck was performed.


Source data was reformatted into 3D MIP projections. Delayed post


contrast acquisition was also obtained. Auto Exposure Controls


were utilized during the CT exam to meet ALARA standards for


radiation dose reduction. 





INDICATION:  Cerebrovascular accident.





FINDINGS: Origins of the great vessels are not included on this


study. Common carotid arteries are widely patent. There is,


however, moderate atherosclerotic plaque at the origins of the


internal carotid arteries, bilaterally. This does result in


approximately 50% stenoses. Otherwise, the internal carotid


arteries are tortuous but patent throughout the neck and to the


skull base.





There is left vertebral arterial dominance. There is focal


stenosis or possible occlusion at the origin of the right


vertebral artery. Otherwise vertebral arteries are patent through


the neck. Distal right vertebral artery is very small but appears


to be patent. Basilar artery has a normal appearance.





Within the head, there is normal opacification of anterior,


middle and posterior cerebral arteries. There is no evidence of


aneurysm or vascular malformation. No filling defect, stenosis or


occlusion is detected. There is no abnormal contrast enhancement.





IMPRESSION: Stenosis or possible occlusion at the origin of right


vertebral artery. There is a dominant left vertebral artery and


normal opacification of basilar artery and posterior cerebral


arteries.





Otherwise, atherosclerotic disease results in approximately 50%


stenosis at the origins of both internal carotid arteries.


Otherwise no filling defect, stenosis or occlusion is identified.





  Dictated on workstation # [id number]





Dict:   20 1127


Trans:   20 1142


 [id number]





Interpreted by:     ARPITA FREDERICK MD


Electronically signed by:


   Reviewed:  Reviewed by Me


Consults :  


Consults Notes


1145: Discussed the case with Dr. Gibson, [facility] stroke neurologist and he 

agrees with not doing TPA however he would still do an ischemic stroke workup. 

He thinks seizure would be far lower on his differential.





Departure


Communication (Admissions)


Time/Spoke to Admitting Phy:  12:56


Discuss the case with Dr. Herrera, internal medicine and he agrees to observe the

patient do a TIA workup on the floor with telemetry and give ciprofloxacin based

on previous micro specimens. Patient is not septic and has a stated allergy to 

Omnicef. DO NOT RESUSCITATE.





Impression





   Primary Impression:  


   UTI (urinary tract infection)


   Qualified Codes:  N30.01 - Acute cystitis with hematuria


   Additional Impressions:  


   Encephalopathy acute


   TIA (transient ischemic attack)


Disposition:   ADMITTED AS INPATIENT


Condition:  Stable





Admissions


Decision to Admit Reason:  Admit from ER (General)


Decision to Admit/Date:  2020


Time/Decision to Admit Time:  12:00





Departure-Patient Inst.


Referrals:  


GUY VILLELA MD (PCP/Family)


Primary Care Physician











ANA HUGGINS                 2020 11:16

## 2020-02-19 NOTE — DIAGNOSTIC IMAGING REPORT
PROCEDURE: CT angiography of the head and CT angiography of the

neck with and without contrast.



TECHNIQUE: Contiguous noncontrast images were obtained from the

skull base through the vertex. After intravenous contrast

administration, helical CT angiography of the neck was performed.

Source data was reformatted into 3D MIP projections. Delayed post

contrast acquisition was also obtained. Auto Exposure Controls

were utilized during the CT exam to meet ALARA standards for

radiation dose reduction. 



INDICATION:  Cerebrovascular accident.



FINDINGS: Origins of the great vessels are not included on this

study. Common carotid arteries are widely patent. There is,

however, moderate atherosclerotic plaque at the origins of the

internal carotid arteries, bilaterally. This does result in

approximately 50% stenoses. Otherwise, the internal carotid

arteries are tortuous but patent throughout the neck and to the

skull base.



There is left vertebral arterial dominance. There is focal

stenosis or possible occlusion at the origin of the right

vertebral artery. Otherwise vertebral arteries are patent through

the neck. Distal right vertebral artery is very small but appears

to be patent. Basilar artery has a normal appearance.



Within the head, there is normal opacification of anterior,

middle and posterior cerebral arteries. There is no evidence of

aneurysm or vascular malformation. No filling defect, stenosis or

occlusion is detected. There is no abnormal contrast enhancement.



IMPRESSION: Stenosis or possible occlusion at the origin of right

vertebral artery. There is a dominant left vertebral artery and

normal opacification of basilar artery and posterior cerebral

arteries.



Otherwise, atherosclerotic disease results in approximately 50%

stenosis at the origins of both internal carotid arteries.

Otherwise no filling defect, stenosis or occlusion is identified.



Dictated by: 



  Dictated on workstation # [id number]

## 2020-02-20 VITALS — DIASTOLIC BLOOD PRESSURE: 70 MMHG | SYSTOLIC BLOOD PRESSURE: 147 MMHG

## 2020-02-20 VITALS — SYSTOLIC BLOOD PRESSURE: 118 MMHG | DIASTOLIC BLOOD PRESSURE: 77 MMHG

## 2020-02-20 VITALS — SYSTOLIC BLOOD PRESSURE: 149 MMHG | DIASTOLIC BLOOD PRESSURE: 65 MMHG

## 2020-02-20 VITALS — SYSTOLIC BLOOD PRESSURE: 111 MMHG | DIASTOLIC BLOOD PRESSURE: 57 MMHG

## 2020-02-20 VITALS — DIASTOLIC BLOOD PRESSURE: 77 MMHG | SYSTOLIC BLOOD PRESSURE: 180 MMHG

## 2020-02-20 VITALS — SYSTOLIC BLOOD PRESSURE: 180 MMHG | DIASTOLIC BLOOD PRESSURE: 77 MMHG

## 2020-02-20 VITALS — DIASTOLIC BLOOD PRESSURE: 65 MMHG | SYSTOLIC BLOOD PRESSURE: 116 MMHG

## 2020-02-20 LAB
ALBUMIN SERPL-MCNC: 3.4 GM/DL (ref 3.2–4.5)
ALP SERPL-CCNC: 68 U/L (ref 40–136)
ALT SERPL-CCNC: 13 U/L (ref 0–55)
BASOPHILS # BLD AUTO: 0 10^3/UL (ref 0–0.1)
BASOPHILS NFR BLD AUTO: 0 % (ref 0–10)
BILIRUB SERPL-MCNC: 0.6 MG/DL (ref 0.1–1)
BUN/CREAT SERPL: 24
CALCIUM SERPL-MCNC: 8.3 MG/DL (ref 8.5–10.1)
CHLORIDE SERPL-SCNC: 106 MMOL/L (ref 98–107)
CHOLEST SERPL-MCNC: 131 MG/DL (ref ?–200)
CO2 SERPL-SCNC: 22 MMOL/L (ref 21–32)
CREAT SERPL-MCNC: 0.8 MG/DL (ref 0.6–1.3)
EOSINOPHIL # BLD AUTO: 0.5 10^3/UL (ref 0–0.3)
EOSINOPHIL NFR BLD AUTO: 5 % (ref 0–10)
ERYTHROCYTE [DISTWIDTH] IN BLOOD BY AUTOMATED COUNT: 19.4 % (ref 10–14.5)
GFR SERPLBLD BASED ON 1.73 SQ M-ARVRAT: > 60 ML/MIN
GLUCOSE SERPL-MCNC: 95 MG/DL (ref 70–105)
HCT VFR BLD CALC: 30 % (ref 35–52)
HDLC SERPL-MCNC: 32 MG/DL (ref 40–60)
HGB BLD-MCNC: 10.3 G/DL (ref 11.5–16)
LYMPHOCYTES # BLD AUTO: 3 X 10^3 (ref 1–4)
LYMPHOCYTES NFR BLD AUTO: 34 % (ref 12–44)
MANUAL DIFFERENTIAL PERFORMED BLD QL: NO
MCH RBC QN AUTO: 35 PG (ref 25–34)
MCHC RBC AUTO-ENTMCNC: 34 G/DL (ref 32–36)
MCV RBC AUTO: 103 FL (ref 80–99)
MONOCYTES # BLD AUTO: 1.1 X 10^3 (ref 0–1)
MONOCYTES NFR BLD AUTO: 12 % (ref 0–12)
NEUTROPHILS # BLD AUTO: 4.2 X 10^3 (ref 1.8–7.8)
NEUTROPHILS NFR BLD AUTO: 48 % (ref 42–75)
PLATELET # BLD: 321 10^3/UL (ref 130–400)
PMV BLD AUTO: 10.4 FL (ref 7.4–10.4)
POTASSIUM SERPL-SCNC: 3.7 MMOL/L (ref 3.6–5)
PROT SERPL-MCNC: 6.7 GM/DL (ref 6.4–8.2)
SODIUM SERPL-SCNC: 137 MMOL/L (ref 135–145)
TRIGL SERPL-MCNC: 104 MG/DL (ref ?–150)
VLDLC SERPL CALC-MCNC: 21 MG/DL (ref 5–40)
WBC # BLD AUTO: 8.9 10^3/UL (ref 4.3–11)

## 2020-02-20 RX ADMIN — ENOXAPARIN SODIUM SCH MG: 30 INJECTION SUBCUTANEOUS at 11:24

## 2020-02-20 RX ADMIN — SODIUM CHLORIDE SCH MLS/HR: 900 INJECTION, SOLUTION INTRAVENOUS at 11:22

## 2020-02-20 RX ADMIN — DOCUSATE SODIUM SCH MG: 100 CAPSULE ORAL at 08:14

## 2020-02-20 RX ADMIN — CIPROFLOXACIN SCH MLS/HR: 2 INJECTION, SOLUTION INTRAVENOUS at 08:14

## 2020-02-20 RX ADMIN — DOCUSATE SODIUM SCH MG: 100 CAPSULE ORAL at 19:53

## 2020-02-20 RX ADMIN — SODIUM CHLORIDE SCH MLS/HR: 900 INJECTION, SOLUTION INTRAVENOUS at 01:29

## 2020-02-20 RX ADMIN — SODIUM CHLORIDE SCH MLS/HR: 900 INJECTION, SOLUTION INTRAVENOUS at 20:00

## 2020-02-20 RX ADMIN — DOCUSATE SODIUM AND SENNOSIDES SCH EA: 8.6; 5 TABLET, FILM COATED ORAL at 19:53

## 2020-02-20 RX ADMIN — ASPIRIN SCH MG: 325 TABLET, COATED ORAL at 08:13

## 2020-02-20 RX ADMIN — ONDANSETRON PRN MG: 2 INJECTION, SOLUTION INTRAMUSCULAR; INTRAVENOUS at 09:31

## 2020-02-20 NOTE — NUR
Patient's IV was leaking and not flushing.  IV removed.  Multiple attempts made to re-insert 
an IV.  Veins blew all times. House Supervisor called to look at potential IV site.  Patient 
at this point refuses to have any IV placed.  Nurses visited with patient about importance 
of staying hydrated. Patient still refuses.  Physician notified.

## 2020-02-20 NOTE — PHYSICAL THERAPY EVALUATION
PT Evaluation-General


Medical Diagnosis


Admission Date


Feb 19, 2020 at 13:12


Medical Diagnosis:  UTI, AMS, aphasia


Onset Date:  Feb 19, 2020





Therapy Diagnosis


Therapy Diagnosis:  Debility





Height/Weight


Height (Feet):  5


Height (Inches):  4.00


Weight (Pounds):  120


Weight (Ounces):  8.0





Precautions


Precautions/Isolations:  Fall Prevention, Standard Precautions





Referral


Physician:  Sepideh Herrera MD


Reason for Referral:  Evaluation/Treatment





Medical History


Pertinent Medical History:  Arthritis, Neuropathy


Additional Medical History


Fibromyalgia


Reviewed History:  Yes





Social History


Home:  Single Level


Current Living Status:  Spouse


Entry Into Home:  Stairs With Railing (2)


PT Steps Into Home:  2





Prior


Prior Level of Function


SCALE: Activities may be completed with or without assistive devices.





6-Indepedent-patient completes the activity by him/herself with no assistance 

from a helper.


5-Set-up or Clean-up Assistance-helper sets up or cleans up; patient completes 

activity. [place] assists only prior to or  


    following the activity.


4-Supervision or Touching Assistance-helper provides verbal cues and/or 

touching/steadying and/or contact guard assistance as patient completes 

activity. Assistance may be provided   


    throughout the activity or intermittently.


3-Partial/Moderate Assistance-helper does LESS THAN HALF the effort. [place] 

lifts, holds or supports trunk or limbs, but provides less than half the effort.


2-Substantial/Maximal Assistance-helper does MORE THAN HALF the effort. [place] 

lifts or holds trunk or limbs and provides more than half the effort.


1-Ytfvumaaw-yhhcvd does ALL the effort. Patient does none of the effort to 

complete the activity. Or, the assistance of 2 or more helpers is required for 

the patient to complete the  


    activity.


If activity was not attempted, code reason:


7-Patient Refused.


9-Not Applicable-not attempted and the patient did not perform the activity 

before the current illness, exacerbation or injury.


10-Not Attempted due to Environmental Limitations-(lack of equipment, weather 

restraints, etc.).


88-Not Attempted due to Medical Conditions or Safety Concerns.


Bed Mobility:  3


Transfers (B,C,W/C):  3


Gait:  3


Stairs:  3


Indoor Mobility (Ambulation):  Needed Some Help


Stairs:  Needed Some Help


Prior Devices Use:  Walker


 helped her push walker.





PT Evaluation-Current


Subjective


Patient is agreeable to therapy at this time but states she will need breaks to 

catch her breath.





Objective


Patient Orientation:  Normal For Age


Attachments:  Loza Catheter, IV





ROM/Strength


ROM Lower Extremities


Decreased bilateral dorsiflexion ROM and decreased bilateral knee extension


Strength Lower Extremities


3+/5 grossly BLE





Integumentary/Posture


Integumentary


See nursing notes.


Bowel Incontinence:  Yes


Bladder Incontinence:  Loza Cath





Sensory


Vision:  Functional


Hearing:  Functional


Hand Dominance:  Right


Sensation Right Lower Extremit:  Intact


Sensation Left Lower Extremity:  Intact





Transfers


Roll Left to Right (QC):  1


Lying to Sitting/Side of Bed(Q:  1


Sit to Stand (QC):  3


Chair/Bed-to-Chair Xfer(QC):  3





Gait


Does the Patient Walk?:  Yes


Mode of Locomotion:  Walk


Anticipated Mode of Locomotion:  Walk


Walk 10 feet (QC):  3


Walk 50 ft with 2 Turns(QC):  3


Walk 150 ft (QC):  3


Distance:  150'


Gait Assistive Device:  FWW


Comments/Gait Description


Patient pushes down instead of forward on her walker. Due to this patient 

required assistance initially to propel walker. After repeated instruction 

patient was able to propel her walker IND.





Wheelchair Training


Does the Pt Use a Wheelchair?:  No





Balance


Sitting Static:  Fair


Sitting Dynamic:  Fair


Standing Static:  Fair


 Standing Dynamic:  Fair





Assessment/Needs


Patient requires thorough instruction and cueing in order to propel her walker 

herself because her  normally does that for her. Patient walks on her 

toes with knees slightly bent and is fairly steady. Patient requires frequent 

standing rest breaks due to fatigue. Patient will benefit from skilled services 

to aide in reaching her maximum LOF.


Rehab Potential:  Fair





PT Long Term Goals


Long Term Goals


PT Long Term Goals Time Frame:  Feb 27, 2020


Roll Left & Right (QC):  3


Sit to Lying (QC):  3


Lying-Sitting on Side/Bed(QC):  3


Sit to Stand (QC):  3


Chair/Bed-to-Chair Xfer(QC):  3


Does the Patient Walk:  Yes


Walk 10 feet (QC):  3


Walk 50ft with 2 Turns (QC):  3


Walk 150 ft (QC):  3





PT Plan


Problem List


Problem List:  Activity Tolerance, Functional Strength, Safety, Balance, Gait, 

Transfer, Bed Mobility, ROM





Treatment/Plan


Treatment Plan:  Continue Plan of Care


Treatment Plan:  Bed Mobility, Education, Functional Activity Danial, Functional 

Strength, Gait, Safety, Therapeutic Exercise, Transfers


Treatment Duration:  Feb 27, 2020


Frequency:  5 times per week


Estimated Hrs Per Day:  .25 hour per day





Safety Risks/Education


Patient Education:  Gait Training, Transfer Techniques


Teaching Recipient:  Patient


Teaching Methods:  Demonstration, Discussion


Response to Teaching:  Reinforcement Needed





Time/GCodes


Time In:  1040


Time Out:  1054


Total Billed Treatment Time:  14


Total Billed Treatment


1 visit 


EVM 14











MAXIMILIANO RODRIGUES PT              Feb 20, 2020 12:03

## 2020-02-20 NOTE — NUR
RD ASSESSMENT 



PMHx: dementia; chronic bronchitis; UTI-chronic; GERD; osteoporosis; hypothyroidism



PT INTERACTION: Pt was awake and pleasant during consult for MST score. Note pt has 
dementia, per chart review. Note pt had no family present at bedside for diet history. Pt 
states current appetite is poor. Note PO intake of 50% x1meal, per chart review. Pt states 
following a regular diet at home and has no issues with chewing/swallowing food. Note pt had 
failed bedside swallow eval, per Ibeth RN. Pt states some issues with nausea/vomiting. Pt 
states unsure of any issues with constipation/diarrhea, and that she is unsure of her last 
BM. Note pt currently on bowel regimen of colace BID, per chart review. Pt states recent 20# 
wt loss, but unsure of timeframe. Note recent 24# wt gain x1mon, per chart review. 



ABNORMAL NUTRITION-RELATED LAB VALUES

LOW:  HDL 32; Ca 8.3

HIGH: BUN 19



Est. kcal needs: 3373-6642 kcal | 30-35 kcal/kg  

Est. Pro needs:  56-66 g Pro | 1.2-1.4 g Pro/kg 



PES STATEMENT: Inadequate oral intake (NI-2.1) related to loss of appetite | nausea | 
vomiting | swallowing difficulty as evidenced by pt interview | PO intake 50% x1meal



INTERVENTION:  

Continue with current diet order of DYS1 Pureed diet, with Nectar-Thick Liquids. 

Due to consistency issues with supplementation and thickeners, cannot recommend a supplement 
at this time. 

Will continue to follow and reassess as pt needs, intake and status change. 



MONITOR/EVALUATE:  

PO Intake; Plan of Care; Hydration Status; Weight Status; Lab Values 





BENTLEY Mcmahan, MS, RD, LD

## 2020-02-20 NOTE — NUR
CM/SS visited with patient for social service consult. 



The patient stated that this CM/SS could visit with her for a little bit but she wanted to 
take a nap soon. CM/SS asked the patient how she felt it was going at home, she stated she 
felt like it was going okay. However, the patients  was present and stated that he is 
the 24 hour care and is having a difficult time due to him being 89-years-old. The patients 
 Anand wanted to talk to this SS out in the [place]. He stated that he already talked 
to [facility] and has a private room reserved for her. He would like the patient to 
transport from the hospital straight over. 



CM/SS called Jeny from [facility]. She states they do have a room for her; however, the  
took her there for one day in the past and brought her home the next day. He has also gone 
back in forth with wanting a room over there. CM/SS send Jeny a referral packet. Will 
continue to follow. Unsure if the patient would be skilled or self-pay.

## 2020-02-20 NOTE — DIAGNOSTIC IMAGING REPORT
PROCEDURE: MR imaging of the brain without contrast.



TECHNIQUE: Multiplanar, multisequence MR imaging of the brain was

performed without contrast.



INDICATION: CVA, weakness.



FINDINGS: The previous MRI brain exam of 11/15/2013 failed to

show any sign of an acute intracranial abnormality. The CT head

and CTA head and neck exam performed on 02/19/2020 also failed to

show any sign of an acute abnormality.



On this study, there is no abnormal signal arising from the brain

on the diffusion series. There is no mass, shift to midline, or

hemorrhage. The ventricles are dilated and the ventricles do seem

somewhat more prominent than noted on the previous MRI brain

exam. The size of the ventricles is disproportionate with the

underlying cortical atrophy and this appearance could be

secondary to normal pressure hydrocephalus. If further study is

desired, then a nuclear medicine cisternogram would be

recommended.



There is no mass, shift of the midline, or hemorrhage to indicate

an acute abnormality. There are areas of increased signal in the

periventricular white matter bilaterally on the FLAIR series.

These are similar to the prior study and may be related to

encephalomalacia from microvascular ischemia. The cortical

atrophy noted previously is perhaps somewhat greater than on the

prior exam.



The sella is not enlarged and the expected carotid flow voids are

evident bilaterally.



There is no acute abnormality of the orbits. The sinuses are

generally clear, although left maxillary antrum is obscured by

artifact. In reviewing the CT head exam, there was no sign of

metal in this area and the precise etiology of the artifact is

not certain. The seventh and eighth nerve complexes are

unremarkable.



IMPRESSION:

1. There is no evidence for an acute intracranial abnormality. In

particular, there is no abnormal signal arising from the brain on

the diffusion series to indicate an area of acute ischemia.

2. The ventricles are dilated and the size of the ventricles does

seem disproportionate to the underlying cortical atrophy. The

possibility of normal pressure hydrocephalus should be

considered. Recommendations, as above.



Dictated by: 



  Dictated on workstation # [id number]

## 2020-02-20 NOTE — OCCUPATIONAL THERAPY EVAL
OT Evaluation-General/PLF


Medical Diagnosis


Admission Date


Feb 19, 2020 at 13:12


Medical Diagnosis:  UTI, AMS, aphasia


Onset Date:  Feb 19, 2020





Therapy Diagnosis


Therapy Diagnosis:  Decreased ADL status





Height/Weight


Height (Feet):  5


Height (Inches):  4.00


Weight (Pounds):  120


Weight (Ounces):  8.0





Precautions


Precautions/Isolations:  Fall Prevention, Standard Precautions





Referral


Physician:  Sepideh Herrera MD


Referral Reason:  Activity Tolerance, Self Care, Evaluation/Treatment, 

Strengthening/ROM





Medical History


Additional Medical History


chronic bronchitis, dementia, neuropathy, fibromyalgia, arthritis, 

osteoarthritis, scoliosis, hypothyroidism, depression, burst fx of L1 vertebral 

body


Current History


Pt woke around 1000 on 2/19/20 and was unable to speak. Pt's family brought to 

ER. Pt was oriented x3/ alert yesterday, able to speak and per charts pt's 

family states pt was at prior level yesterday.


Reviewed History:  Yes





Social History


Home:  Single Level


Current Living Status:  Spouse


Entry Into Home:  Stairs With Railing (2)


 Steps Into Home:  2


Pt states "a couple" stairs, limited talking during session





ADL-Prior Level of Function


SCALE: Activities may be completed with or without assistive devices.





6-Indepedent-patient completes the activity by him/herself with no assistance 

from a helper.


5-Set-up or Clean-up Assistance-helper sets up or cleans up; patient completes 

activity. [place] assists only prior to or  


    following the activity.


4-Supervision or Touching Assistance-helper provides verbal cues and/or 

touching/steadying and/or contact guard assistance as patient completes 

activity. Assistance may be provided   


    throughout the activity or intermittently.


3-Partial/Moderate Assistance-helper does LESS THAN HALF the effort. [place] 

lifts, holds or supports trunk or limbs, but provides less than half the effort.


2-Substantial/Maximal Assistance-helper does MORE THAN HALF the effort. [place] 

lifts or holds trunk or limbs and provides more than half the effort.


6-Yryemlepj-krjujb does ALL the effort. Patient does none of the effort to 

complete the activity. Or, the assistance of 2 or more helpers is required for 

the patient to complete the  


    activity.


If activity was not attempted, code reason:


7-Patient Refused.


9-Not Applicable-not attempted and the patient did not perform the activity 

before the current illness, exacerbation or injury.


10-Not Attempted due to Environmental Limitations-(lack of equipment, weather 

restraints, etc.).


88-Not Attempted due to Medical Conditions or Safety Concerns.


ADL PLOF Comments


Pt states pt required assist with all ADLs from 


Self Care:  Needed Some Help


Functional Cognition:  Needed Some Help (dmentia)


DME/Equipment:  Bath Chair, Grab Bars, Tub/Shower, Toilet/Riser


DME/Equipment Comments


walker, cane, shower chair, grab bars, tub/ shower.


Occupation:  retired beautician


Drive Self:  No





OT Current Status


Subjective


Pt (Joy) seen in bed, reclined in bed. Nursing present, states pt just got 

sick. Pt has bucket next to self with mucous in it. Pt states 5/10 pain in lower

abdomen, nursing states pt has "mass" on abdomen. Pt agreeable to eval, though 

denies ADLs and OOB activity due to pain.





Mental Status/Objective


Patient Orientation:  Person, Place





Current


Glasses/Contacts:  Yes


Hearing Aids:  No


Dentures/Partials:  Yes


Hand Dominance:  Right


Upper Extremity ROM


WFL (~120* shoulder flexion), able to touch shoulders.


Upper Extremity Coordination


WFL- increased time needed for finger opposition


Upper Extremity Sensation


WFL


Upper Extremity Strength


Decreased bilaterally





ADL-Treatment


Oral Hygiene (QC):  7





Other Treatments


Pt seen in bed, nursing present.  pt requires encouragement to talk, utilizes 

fingers to give pain rating and points to painful areas. Pt states she has been 

feeling weaker than normal. Pt completes orientation questions, unable to 

determine why brought to hospital yesterday. Pt re-educated on symptoms 

yesterday. pt provides environment/ home hx: pt states  assists with all 

ADLs, is able to complete "some things sometimes," depending on strength of day.

Pt ROM/ MMT completed, pt educated on OT role and continuation of strengthening 

to return home safely. Pt educated on sitting up in chair later through the day 

to increase strength. Pt agrees, all needs met, call light in reach.





Education


OT Patient Education:  Correct positioning, Rehab process


Teaching Recipient:  Patient


Teaching Methods:  Demonstration, Discussion


Response to Teaching:  Verbalize Understanding, Return Demonstration





OT Long Term Goals


Long Term Goals


Time Frame:  Feb 27, 2020


Eating (QC):  6


Oral Hygiene (QC):  6


Toileting Hygiene (QC):  3


Shower/Bathe Self (QC):  3


Upper Body Dressing (QC):  3


Lower Body Dressing (QC):  3


On/Off Footwear (QC):  3


Additional Goals:  1-Demonstrate ADL Tasks, 2-Verbalize Understanding, 

3-ImproveStrength/Danial


1=Demonstrate adherence to instructed precautions during ADL tasks.


2=Patient will verbalize/demonstrate understanding of assistive 

devices/modifications for ADL.


3=Patient will improve strength/tolerance for activity to enable patient to 

perform ADL's.





OT Education/Plan


Problem List/Assessment


Assessment:  Decreased Activ Tolerance, Decreased UE Strength, Dependent 

Transfers, Impaired Cognition, Impaired Funct Balance, Impaired I ADL's, 

Impaired Self-Care Skills





Discharge Recommendations


Plan/Recommendations:  Continue POC


Therapy Discharge Recommendati:  24 Hour Supervision, Home & Family





Treatment Plan/Plan of Care


Treatment,Training & Education:  Yes


Patient would benefit from OT for education, treatment and training to promote 

independence in ADL's, mobility, safety and/or upper extremity function for 

ADL's.


Plan of Care:  ADL Retraining, Caregiver Training, Functional Mobility, UE Funct

Exercise/Act


Treatment Duration:  Feb 27, 2020


Frequency:  5 times per week


Estimated Hrs Per Day:  .25 hour per day


Agreement:  Yes


Rehab Potential:  Fair





Time/GCodes


Start Time:  09:28


Stop Time:  09:40


Total Time Billed (hr/min):  12


Billed Treatment Time


1, EVM (12)











ASHLEY MENDOZA OTR                Feb 20, 2020 10:15

## 2020-02-20 NOTE — NUR
Attempted Dysphagia screen and patient became nauseated with some emesis before water 
challenge could begin.  Will attempt again later.

## 2020-02-20 NOTE — SPEECH THERAPY PROGRESS NOTE
Therapy Progress Note


Orders received for Bedside Dysphagia Evaluation.  Speech Therapist is not 

available this date.  Spoke with patient's nurse this morning and she will 

complete a nursing bedside swallow assessment.  Will await these results.  Dr. Herrera notified as well.











TO BHARDWAJ PT             Feb 20, 2020 08:46

## 2020-02-20 NOTE — NUR
After taking Tramadol for pain, patient reports that pain is not improved.  Patient stated 
that pain was because of her catheter at initial assessment, no noted abnormalities on 
focused assessment of catheter.  At re-assessment, patient reports that pain is in lower 
abdomen.  Upon assessment, patient has large, round, hard area at lower abdomen.  Bowel 
sounds are hypoactive. Physician notified.

## 2020-02-20 NOTE — HISTORY & PHYSICAL-HOSPITALIST
History of Present Illness


HPI/Chief Complaint


Sarah Sellers is an 87-year-old female who presented after having word finding 

difficulties at home.  She reports that it came on suddenly yesterday morning.  

She denies any focal weakness.  She denies any vision changes.  She denies any 

fevers or chills.  She denies any chest pain or shortness of breath.  She does 

report some dysuria.  She has been feeling nauseous this morning.  She has no 

history of stroke.  She lives at home with her .


Source:  patient


Exam Limitations:  no limitations


Date Seen


20


Time Seen by a Provider:  09:55


Attending Physician


Heron Herrera MD


PCP


Ector Martin MD


Referring Physician





Date of Admission


2020 at 13:12





Home Medications & Allergies


Home Medications


Reviewed patient Home Medication Reconciliation performed by pharmacy medication

reconciliations technician and/or nursing.


Patients Allergies have been reviewed.





Allergies





Allergies


Coded Allergies


  cefdinir (Verified Allergy, Mild, RASH, 17)








Past Medical-Social-Family Hx


Past Med/Social Hx:  Reviewed Nursing Past Med/Soc Hx


Patient Social History


Alcohol Use:  Denies Use


Recreational Drug Use:  No


Smoking Status:  Former Smoker


Former Smoker, Quit:  Sep 1, 1982


Type Used:  Cigarettes


2nd Hand Smoke Exposure:  No


Physical Abuse Screen:  No


Sexual Abuse:  No


Recent Foreign Travel:  No


Contact w/other who traveled:  No


Recent Hopitalizations:  No


Recent Infectious Disease Expo:  No





Immunizations Up To Date


Tetanus Booster (TDap):  Unknown


Date of Pneumonia Vaccine:  2014


Date of Influenza Vaccine:  Oct 1, 2013





Seasonal Allergies


Seasonal Allergies:  No





Past Medical History


Surgeries:  Bladder Surgery, Breast, Hysterectomy, Orthopedic, Tonsillectomy


Currently Using CPAP:  No


Currently Using BIPAP:  No


Neurological:  Dementia, Neuropathy


Pregnant:  No


Reproductive:  No


Sexually Transmitted Disease:  No


HIV/AIDS:  No


Female Reproductive Disorders:  Denies


Hysterectomy


Genitourinary:  UTI-Chronic


Gastrointestinal:  Gastroesophageal Reflux


Musculoskeletal:  Osteoporosis, Arthritis, Fibromyalgia, Back Injury, Scoliosis


Endocrine:  Hypothyroidsim


Cancer:  Breast


Did You Recieve Any Treatments:  No


Psychosocial:  Depression


History of Blood Disorders:  Yes (ANEMIA)





Review of Systems


Constitutional:  no symptoms reported


EENTM:  no symptoms reported


Respiratory:  no symptoms reported


Cardiovascular:  no symptoms reported


Gastrointestinal:  abdominal pain, nausea


Genitourinary:  dysuria


Musculoskeletal:  no symptoms reported


Skin:  no symptoms reported


Psychiatric/Neurological:  No Symptoms Reported





Physical Exam


Physical Exam


Vital Signs





Vital Signs - First Documented








 20





 10:54


 


Temp 36.9


 


Pulse 88


 


Resp 16


 


B/P (MAP) 110/54 (72)


 


Pulse Ox 97


 


O2 Delivery Room Air





Capillary Refill : Less Than 3 SecondsLess Than 3 Seconds


Height, Weight, BMI


Height: 5'4.00"


Weight: 120lbs. 8.0oz. [id number]; 20.25 BMI


Method:Stated


General Appearance:  No Apparent Distress, Thin


HEENT:  PERRL/EOMI, Pharynx Normal


Neck:  Normal Inspection, Supple


Respiratory:  Lungs Clear, Normal Breath Sounds, No Respiratory Distress


Cardiovascular:  Regular Rate, Rhythm, No Edema, No Murmur


Gastrointestinal:  Normal Bowel Sounds, Soft, Tenderness


Extremity:  Normal Inspection, Non Tender, No Pedal Edema


Neurologic/Psychiatric:  Alert, Oriented x3, No Motor/Sensory Deficits, Normal 

Mood/Affect


Skin:  Normal Color, Warm/Dry





Results


Results/Procedures


Labs


Laboratory Tests


20 10:46








20 05:31








Patient resulted labs reviewed.


Imaging:  Reviewed Imaging Report





Assessment/Plan


Admission Diagnosis


urinary tract infection


Admission Status:  Observation





Assessment and Plan


Urinary tract infection


not septic


UA consistent with urinary tract infection


Urine culture pending


Await culture results and susceptibilities


Started on ciprofloxacin





Aphasia


Possible TIA


Difficulty with word finding at home


CT head without acute abnormalities


CTA normal


EKG with normal sinus rhythm


Started on aspirin and Lipitor


symptoms likely attributable to UTI





debility


PT/OT





DVT prophylaxis: Lovenox





Diagnosis/Problems


Diagnosis/Problems





(1) UTI (urinary tract infection)


Status:  Acute


Qualifiers:  


   Urinary tract infection type:  acute cystitis  Hematuria presence:  with 

hematuria  Qualified Codes:  N30.01 - Acute cystitis with hematuria


(2) Encephalopathy acute


Status:  Acute





Clinical Quality Measures


DVT/VTE Risk/Contraindication:


Risk Factor Score Per Nursin


RFS Level Per Nursing on Admit:  1=Low/No VTE PPX





Stroke:


Date of last known well:  2020


Time of last known well:  10:00


Symptoms onset unknown:  No











HERON HERRERA MD              2020 10:57

## 2020-02-21 VITALS — SYSTOLIC BLOOD PRESSURE: 127 MMHG | DIASTOLIC BLOOD PRESSURE: 64 MMHG

## 2020-02-21 VITALS — DIASTOLIC BLOOD PRESSURE: 67 MMHG | SYSTOLIC BLOOD PRESSURE: 115 MMHG

## 2020-02-21 VITALS — DIASTOLIC BLOOD PRESSURE: 60 MMHG | SYSTOLIC BLOOD PRESSURE: 96 MMHG

## 2020-02-21 VITALS — DIASTOLIC BLOOD PRESSURE: 74 MMHG | SYSTOLIC BLOOD PRESSURE: 134 MMHG

## 2020-02-21 VITALS — DIASTOLIC BLOOD PRESSURE: 64 MMHG | SYSTOLIC BLOOD PRESSURE: 118 MMHG

## 2020-02-21 VITALS — SYSTOLIC BLOOD PRESSURE: 138 MMHG | DIASTOLIC BLOOD PRESSURE: 63 MMHG

## 2020-02-21 RX ADMIN — DOCUSATE SODIUM AND SENNOSIDES SCH EA: 8.6; 5 TABLET, FILM COATED ORAL at 20:19

## 2020-02-21 RX ADMIN — DOCUSATE SODIUM AND SENNOSIDES SCH EA: 8.6; 5 TABLET, FILM COATED ORAL at 09:27

## 2020-02-21 RX ADMIN — NITROFURANTOIN (MONOHYDRATE/MACROCRYSTALS) SCH MG: 75; 25 CAPSULE ORAL at 20:19

## 2020-02-21 RX ADMIN — SODIUM CHLORIDE SCH MLS/HR: 900 INJECTION, SOLUTION INTRAVENOUS at 05:45

## 2020-02-21 RX ADMIN — DOCUSATE SODIUM SCH MG: 100 CAPSULE ORAL at 09:27

## 2020-02-21 RX ADMIN — ENOXAPARIN SODIUM SCH MG: 30 INJECTION SUBCUTANEOUS at 09:27

## 2020-02-21 RX ADMIN — DOCUSATE SODIUM SCH MG: 100 CAPSULE ORAL at 20:19

## 2020-02-21 RX ADMIN — ASPIRIN SCH MG: 325 TABLET, COATED ORAL at 09:32

## 2020-02-21 RX ADMIN — CIPROFLOXACIN SCH MLS/HR: 2 INJECTION, SOLUTION INTRAVENOUS at 09:24

## 2020-02-21 NOTE — NUR
CM/SS follow up with discharge planning. 



Plan: The patient will go to [facility] on Monday 2/24/20. 



CM/SS informed and discussed this with the patient and her  Anand. They both 
verbalized understanding and are agreeable with the plan. They stated they did not have any 
other needs at this time.

## 2020-02-21 NOTE — OCCUPATIONAL THER DAILY NOTE
OT Current Status-Daily Note


Subjective


Pt found in chair being fed by nurse tech. Pt stated that her " bottom was sore"

and c/o pain throughout session. Pt asked for help frequently when asked to 

perform a task.





Mental Status/Objective


Patient Orientation:  Confused


Attachments:  Telemetry





ADL-Treatment


Pt stood from chair with min A, ambulated to bathroom with FWW, and sat on 

toilet. CHRISTIANE/s noticed that IV was bleeding, and the nurse was notified and 

removed the IV. Pt brushed R side of head, and CHRISTIANE/s helped brush the rest of 

her hair. Pt stood from toilet min A, reached area to wipe but was unable to 

cleanse efficiently, CHRISTIANE/s completed toileting hygiene. Pt stood from toilet 

with min A, ambulated to bed, sat on EOB to don brief with mod A, and stood to 

pull up brief with mod A. She doffed gown independently but required min A to 

don clean gown. Mod A for sitting to supine. Pt required verbal encouragement to

complete tasks without assistance. Pt positioned comfortably in bed with call 

light in reach and all needs met. Safety measures in place.


Therapy Code Descriptions/Definitions 





Functional [place] Measure:


0=Not Assessed/NA        4=Minimal Assistance


1=Total Assistance        5=Supervision or Setup


2=Maximal Assistance  6=Modified [place]


3=Moderate Assistance 7=Complete IndependenceSCALE: Activities may be completed 

with or without assistive devices.





6-Indepedent-patient completes the activity by him/herself with no assistance f

rom a helper.


5-Set-up or Clean-up Assistance-helper sets up or cleans up; patient completes 

activity. [place] assists only prior to or  


    following the activity.


4-Supervision or Touching Assistance-helper provides verbal cues and/or 

touching/steadying and/or contact guard assistance as patient completes 

activity. Assistance may be provided   


    throughout the activity or intermittently.


3-Partial/Moderate Assistance-helper does LESS THAN HALF the effort. [place] 

lifts, holds or supports trunk or limbs, but provides less than half the effort.


2-Substantial/Maximal Assistance-helper does MORE THAN HALF the effort. [place] 

lifts or holds trunk or limbs and provides more than half the effort.


2-Qvetykwve-dzeyds does ALL the effort. Patient does none of the effort to 

complete the activity. Or, the assistance of 2 or more helpers is required for 

the patient to complete the  


    activity.


If activity was not attempted, code reason:


7-Patient Refused.


9-Not Applicable-not attempted and the patient did not perform the activity 

before the current illness, exacerbation or injury.


10-Not Attempted due to Environmental Limitations-(lack of equipment, weather 

restraints, etc.).


88-Not Attempted due to Medical Conditions or Safety Concerns.


Upper Body Dressing (QC):  3


Lower Body Dressing (QC):  3


Toileting Hygiene (QC):  2 (Pt able to reach area but not able to efficiently 

cleanse after BM. )


Toilet Transfer (QC):  3





OT Long Term Goals


Long Term Goals


Time Frame:  Feb 27, 2020


Eating (QC):  6


Oral Hygiene (QC):  6


Toileting Hygiene (QC):  3


Shower/Bathe Self (QC):  3


Upper Body Dressing (QC):  3


Lower Body Dressing (QC):  3


On/Off Footwear (QC):  3


Additional Goals:  1-Demonstrate ADL Tasks, 2-Verbalize Understanding, 

3-ImproveStrength/Danial


1=Demonstrate adherence to instructed precautions during ADL tasks.


2=Patient will verbalize/demonstrate understanding of assistive 

devices/modifications for ADL.


3=Patient will improve strength/tolerance for activity to enable patient to 

perform ADL's.





OT Education/Plan


Problem List/Assessment


Assessment:  Decreased Activ Tolerance, Decreased Safety Aware, Decreased UE 

Strength, Impaired Bed Mobility, Impaired Cognition, Impaired I ADL's, Impaired 

Self-Care Skills





Discharge Recommendations


Plan/Recommendations:  Continue POC





Treatment Plan/Plan of Care


Patient would benefit from OT for education, treatment and training to promote 

independence in ADL's, mobility, safety and/or upper extremity function for 

ADL's.


Plan of Care:  ADL Retraining, Caregiver Training, Functional Mobility, UE Funct

Exercise/Act


Treatment Duration:  Feb 27, 2020


Frequency:  5 times per week


Estimated Hrs Per Day:  .25 hour per day


Agreement:  Yes


Rehab Potential:  Fair





Time/GCodes


Start Time:  13:31


Stop Time:  13:54


Total Time Billed (hr/min):  23


Billed Treatment Time


1 visit- 2 ADL(23 min)











TO CASTRO               Feb 21, 2020 14:08

## 2020-02-21 NOTE — ST DYSPHAGIA EVALUATION
Speech Evaluation-General


Medical Diagnosis


UTI, AMS, aphasia


Onset Date:  Feb 19, 2020





Therapy Diagnosis


Therapy Diagnosis:  Oropharyngeal Dysphagia





Precautions


Precautions:  Aspiration





Referral


Referring Physician:  Dr. Herrera


Reason for Referral:  Evaluation/Treatment





Medical History


Pertinent Medical History:  Arthritis, Neuropathy


Reviewed History:  Yes





Social History


Current Living Status:  Spouse





Speech PLF/Current-Dysphagia


Prior Level of Function


Patient reported having a soft and regular diet prior to hospital stay.





Subjective


Patient was sleeping upon arrival and awoken to complete evaluation tasks. 

Patient reported that her appetite is decreased, however completed several bites

to complete the evaluation.





Cognitive Status


Patient Orientation:  Person, Place, Time, Situation





Oral Motor Skills


Dentition:  Edentalous (Patient was edentalous on top for the evaluation. She 

had her dentures in her purse, but did not have them at the time.)


Denture Type:  Full- Lower


Current Food Consistancy:  Regular


Ability to Follow Directions:  Good


Oral Expression Ability:  No Impairment





Voice


Voice Phonatory-Based Quality:  Normal


Voice Pitch:  Normal


Voice Loudness:  Normal





Face


Facial Symmetry:  Symmetrical





Oral-Facial Assessment


Oral-Facial Dentition:  Normal


Labial Seal Description:  Normal


Smile:  Normal


Lingual Protrusion:  Normal


Lingual ROM:  Normal


Lingual Strength:  Normal





Dysphagia Evaluation


Consistencies Presented:  Thin Liquid, Pureed


Dietary Recommendations:  Pureed


Liquid Recommendations:  Thin


Swallowing Precautions:  Alternate Liquids/Solids, Double Swallow, Decreased 

Bolus 1/2 Tsp, Liquids from Straw, Oral Supervision Staff, Small Bites and Sips,

Sitting Upright 90 Degrees, Sitting 90 Degrees 30 Post Intake


Dysphagia Evaluation Summary


Patient was admitted to the ICU s/p word finding difficulties. Patient was pres

ented thin liquids via 1/2 tsp spoon and straw and presented with no s/s of 

penetration or aspiration. Patient was then presented puree and mechanical soft 

consistencies via 1/2 tsp spoon. Patient presented with delayed swallow 

initiation with mechanical soft and no s/s of penetration or aspiration with 

puree. It is recommended that the patient receive a DYSPHAGIA I with thin liqui

ds. Additionally, patient will utilize compensatory strategies of small bites 

and sips, alternating liquids and solids, and sitting upright for all oral 

intake.


Barriers to Learning


Current medical status.





Speech Short Term Goals


Short Term Goals


Short Term Goals


1. Patient will tolerate least restrictive diet without s/s of aspiration at 

90%.


2. Patient will utilize compensatory strategies as trained at 90% with minimal 

cues.





Speech Long Term Goals


Long Term Goals


Patient will maintain adequate nutrition/hydration via safe and effective 

swallow function.





Speech-Plan


Patient/Family Goals


Patient/Family Goals:  


Patient reported wanting to return home to previous level of independence.





Treatment Plan


Speech Therapy Treatment Plan:  Continue Plan of Care


Treatment Duration:  Feb 28, 2020


Frequency:  2 times per week


Estimated Hrs Per Day:  .25 hour per day


Rehab Potential:  Fair


Barriers to Learning:  


Current medical status


Pt/Family Agrees to Plan:  Yes





Safety Risks/Education


Teaching Recipient:  Patient


Teaching Methods:  Demonstration, Discussion


Response to Teaching:  Verbalize Understanding


Education Topics Provided:  


Utilization of compensatory strategies during meal time.





Time


Speech Therapy Time In:  08:30


Speech Therapy Time Out:  08:45


Total Billed Time:  15


Billed Treatment Time


1, LISSETTE Rachel            Feb 21, 2020 11:39

## 2020-02-21 NOTE — PROGRESS NOTE - HOSPITALIST
Subjective


HPI/CC On Admission


Date Seen by Provider:  2020


Time Seen by Provider:  09:30


Sarah Sellers is an 87-year-old female who presented after having word finding 

difficulties at home.  She reports that it came on suddenly yesterday morning.  

She denies any focal weakness.  She denies any vision changes.  She denies any 

fevers or chills.  She denies any chest pain or shortness of breath.  She does 

report some dysuria.  She has been feeling nauseous this morning.  She has no 

history of stroke.  She lives at home with her .


Subjective/Events-last exam


she denies any complaints or concerns.  She denies any fevers, chills, chest 

pain, shortness of breath, abdominal pain, nausea, or vomiting.





Objective


Exam


Vital Signs





Vital Signs








  Date Time  Temp Pulse Resp B/P (MAP) Pulse Ox O2 Delivery O2 Flow Rate FiO2


 


20 08:00 36.6 70 18 96/60 (72) 97 Room Air  





Capillary Refill : Less Than 3 SecondsLess Than 3 Seconds


General Appearance:  No Apparent Distress, Thin


Respiratory:  Lungs Clear, Normal Breath Sounds, No Respiratory Distress


Cardiovascular:  Regular Rate, Rhythm, No Edema, No Murmur


Gastrointestinal:  Normal Bowel Sounds, Non Tender


Extremity:  Normal Inspection, Non Tender


Neurologic/Psychiatric:  Alert, Normal Mood/Affect


Skin:  Normal Color, Warm/Dry





Results/Procedures


Lab


Patient resulted labs reviewed.





Assessment/Plan


Assessment and Plan


Assess & Plan/Chief Complaint


Urinary tract infection


urine culture revealed Klebsiella pneumonia


Transition to Macrobid





Possible TIA


MRI negative for acute stroke


continue aspirin and Lipitor


symptoms likely attributable to UTI





debility


continue PT/OT





DVT prophylaxis: Lovenox





Diagnosis/Problems


Diagnosis/Problems





(1) UTI (urinary tract infection)


Status:  Acute


Qualifiers:  


   Urinary tract infection type:  acute cystitis  Hematuria presence:  with 

hematuria  Qualified Codes:  N30.01 - Acute cystitis with hematuria


(2) Encephalopathy acute


Status:  Acute


(3) Debility


Status:  Acute





Clinical Quality Measures


DVT/VTE Risk/Contraindication:


Risk Factor Score Per Nursin


RFS Level Per Nursing on Admit:  1=Low/No VTE PPX





Stroke:


Date of last known well:  2020


Time of last known well:  10:00


Symptoms onset unknown:  No











HERON VELASCO MD              2020 11:36

## 2020-02-21 NOTE — PHYSICAL THERAPY DAILY NOTE
PT Daily Note-Current


Subjective


Patient is agreeable to therapy at this time.





Appearance


Patient in recliner with call light and bedside table with in reach. Chair alarm

turned on.





Mental Status


Patient Orientation:  Confused


Attachments:  Loza Catheter, IV





Transfers


SCALE: Activities may be completed with or without assistive devices.





6-Indepedent-patient completes the activity by him/herself with no assistance 

from a helper.


5-Set-up or Clean-up Assistance-helper sets up or cleans up; patient completes 

activity. [place] assists only prior to or  


    following the activity.


4-Supervision or Touching Assistance-helper provides verbal cues and/or 

touching/steadying and/or contact guard assistance as patient completes 

activity. Assistance may be provided   


    throughout the activity or intermittently.


3-Partial/Moderate Assistance-helper does LESS THAN HALF the effort. [place] 

lifts, holds or supports trunk or limbs, but provides less than half the effort.


2-Substantial/Maximal Assistance-helper does MORE THAN HALF the effort. [place] 

lifts or holds trunk or limbs and provides more than half the effort.


9-Pqnuvwvya-rzvhyk does ALL the effort. Patient does none of the effort to 

complete the activity. Or, the assistance of 2 or more helpers is required for 

the patient to complete the  


    activity.


If activity was not attempted, code reason:


7-Patient Refused.


9-Not Applicable-not attempted and the patient did not perform the activity 

before the current illness, exacerbation or injury.


10-Not Attempted due to Environmental Limitations-(lack of equipment, weather 

restraints, etc.).


88-Not Attempted due to Medical Conditions or Safety Concerns.


Roll Left & Right (QC):  2


Lying to Sitting/Side of Bed(Q:  2


Sit to Stand (QC):  3


Chair/Bed-to-Chair Xfer(QC):  3





Gait Training


Does the Patient Walk?:  Yes


Distance:  80'


Walk 10 feet (QC):  4


Walk 50 ft with 2 Turns(QC):  4


Gait Assistive Device:  FWW


CGA for safety. Patient is able to propel her walker herself but states that her

 usually does it for her.





Wheelchair Training


Does the Pt Use a Wheelchair?:  No





Exercises


Seated Therapy Exercises:  Ankle pumps, Long arc quads, Hip flexion


Seated Reps:  10 (BLE)





Treatments


Ambulation, bed mobility, transfers, BLE exercises.





Assessment


Current Status:  Fair Progress


Patient  is able to propel walker herself but does not walk in straight line, 

she walks off to the sides while walking. Patient is able to perform BLE exerci

ses IND but with a limited ROM.





PT Long Term Goals


Long Term Goals


PT Long Term Goals Time Frame:  Feb 27, 2020


Roll Left & Right (QC):  3


Sit to Lying (QC):  3


Lying-Sitting on Side/Bed(QC):  3


Sit to Stand (QC):  3


Chair/Bed-to-Chair Xfer(QC):  3


Does the Patient Walk:  Yes


Walk 10 feet (QC):  3


Walk 50ft with 2 Turns (QC):  3


Walk 150 ft (QC):  3





PT Plan


Problem List


Problem List:  Activity Tolerance, Functional Strength, Safety, Balance, Gait, 

Transfer, Bed Mobility, ROM





Treatment/Plan


Treatment Plan:  Continue Plan of Care


Treatment Plan:  Bed Mobility, Education, Functional Activity Danial, Functional 

Strength, Gait, Safety, Therapeutic Exercise, Transfers


Treatment Duration:  Feb 27, 2020


Frequency:  5 times per week


Estimated Hrs Per Day:  .25 hour per day





Safety Risks/Education


Patient Education:  Gait Training, Transfer Techniques


Teaching Recipient:  Patient


Teaching Methods:  Discussion


Response to Teaching:  Reinforcement Needed





Time/GCodes


Time In:  910


Time Out:  926


Total Billed Treatment Time:  16


Total Billed Treatment


1 visit 


GT 16











MAXIMILIANO RODRIGUES PT              Feb 21, 2020 10:18

## 2020-02-22 VITALS — SYSTOLIC BLOOD PRESSURE: 124 MMHG | DIASTOLIC BLOOD PRESSURE: 68 MMHG

## 2020-02-22 VITALS — DIASTOLIC BLOOD PRESSURE: 71 MMHG | SYSTOLIC BLOOD PRESSURE: 139 MMHG

## 2020-02-22 VITALS — DIASTOLIC BLOOD PRESSURE: 63 MMHG | SYSTOLIC BLOOD PRESSURE: 136 MMHG

## 2020-02-22 VITALS — DIASTOLIC BLOOD PRESSURE: 66 MMHG | SYSTOLIC BLOOD PRESSURE: 119 MMHG

## 2020-02-22 VITALS — SYSTOLIC BLOOD PRESSURE: 125 MMHG | DIASTOLIC BLOOD PRESSURE: 66 MMHG

## 2020-02-22 VITALS — DIASTOLIC BLOOD PRESSURE: 67 MMHG | SYSTOLIC BLOOD PRESSURE: 125 MMHG

## 2020-02-22 RX ADMIN — ASPIRIN SCH MG: 325 TABLET, COATED ORAL at 09:41

## 2020-02-22 RX ADMIN — NITROFURANTOIN (MONOHYDRATE/MACROCRYSTALS) SCH MG: 75; 25 CAPSULE ORAL at 21:40

## 2020-02-22 RX ADMIN — DOCUSATE SODIUM AND SENNOSIDES SCH EA: 8.6; 5 TABLET, FILM COATED ORAL at 09:41

## 2020-02-22 RX ADMIN — DOCUSATE SODIUM AND SENNOSIDES SCH EA: 8.6; 5 TABLET, FILM COATED ORAL at 21:40

## 2020-02-22 RX ADMIN — ENOXAPARIN SODIUM SCH MG: 30 INJECTION SUBCUTANEOUS at 11:09

## 2020-02-22 RX ADMIN — DOCUSATE SODIUM SCH MG: 100 CAPSULE ORAL at 09:41

## 2020-02-22 RX ADMIN — ONDANSETRON PRN MG: 2 INJECTION, SOLUTION INTRAMUSCULAR; INTRAVENOUS at 05:00

## 2020-02-22 RX ADMIN — DOCUSATE SODIUM SCH MG: 100 CAPSULE ORAL at 21:40

## 2020-02-22 RX ADMIN — NITROFURANTOIN (MONOHYDRATE/MACROCRYSTALS) SCH MG: 75; 25 CAPSULE ORAL at 09:41

## 2020-02-22 NOTE — PROGRESS NOTE - HOSPITALIST
Subjective


HPI/CC On Admission


Date Seen by Provider:  2020


Time Seen by Provider:  09:15


Sarah Sellers is an 87-year-old female who presented after having word finding 

difficulties at home.  She reports that it came on suddenly yesterday morning.  

She denies any focal weakness.  She denies any vision changes.  She denies any 

fevers or chills.  She denies any chest pain or shortness of breath.  She does 

report some dysuria.  She has been feeling nauseous this morning.  She has no 

history of stroke.  She lives at home with her .


Subjective/Events-last exam


She reports that she is feeling sleepy this morning.  She is feeling weak.  She 

denies any fevers or chills.  She denies any chest pain or shortness of breath. 

She has no other complaints or concerns.





Objective


Exam


Vital Signs





Vital Signs








  Date Time  Temp Pulse Resp B/P (MAP) Pulse Ox O2 Delivery O2 Flow Rate FiO2


 


20 08:00     96 Room Air  


 


20 08:00 36.3 68 16 136/63 (87)    





Capillary Refill : Less Than 3 SecondsLess Than 3 Seconds


General Appearance:  No Apparent Distress, Thin


Respiratory:  Lungs Clear, Normal Breath Sounds, No Respiratory Distress


Cardiovascular:  Regular Rate, Rhythm, No Edema, No Murmur


Gastrointestinal:  Normal Bowel Sounds, Non Tender, Soft


Extremity:  Normal Inspection, Non Tender, No Pedal Edema


Neurologic/Psychiatric:  Alert, Normal Mood/Affect


Skin:  Warm/Dry, Pallor





Results/Procedures


Lab


Patient resulted labs reviewed.





Assessment/Plan


Assessment and Plan


Assess & Plan/Chief Complaint


Urinary tract infection


urine culture revealed Klebsiella pneumonia


Continue Macrobid





Possible TIA


MRI negative for acute stroke


continue aspirin and Lipitor


symptoms likely attributable to UTI





debility


continue PT/OT





Dysphagia


Continue ST


Dysphagia diet





DVT prophylaxis: Lovenox





Diagnosis/Problems


Diagnosis/Problems





(1) UTI (urinary tract infection)


Status:  Acute


Qualifiers:  


   Urinary tract infection type:  acute cystitis  Hematuria presence:  with 

hematuria  Qualified Codes:  N30.01 - Acute cystitis with hematuria


(2) Encephalopathy acute


Status:  Resolved


Resolution Date/Time:  20 @ 11:16


(3) Debility


Status:  Acute


(4) Dysphagia


Status:  Acute





Clinical Quality Measures


DVT/VTE Risk/Contraindication:


Risk Factor Score Per Nursin


RFS Level Per Nursing on Admit:  1=Low/No VTE PPX





Stroke:


Date of last known well:  2020


Time of last known well:  10:00


Symptoms onset unknown:  No











HERON VELASCO MD              2020 11:16

## 2020-02-23 VITALS — SYSTOLIC BLOOD PRESSURE: 132 MMHG | DIASTOLIC BLOOD PRESSURE: 76 MMHG

## 2020-02-23 VITALS — DIASTOLIC BLOOD PRESSURE: 73 MMHG | SYSTOLIC BLOOD PRESSURE: 130 MMHG

## 2020-02-23 VITALS — DIASTOLIC BLOOD PRESSURE: 72 MMHG | SYSTOLIC BLOOD PRESSURE: 118 MMHG

## 2020-02-23 VITALS — DIASTOLIC BLOOD PRESSURE: 70 MMHG | SYSTOLIC BLOOD PRESSURE: 154 MMHG

## 2020-02-23 VITALS — DIASTOLIC BLOOD PRESSURE: 69 MMHG | SYSTOLIC BLOOD PRESSURE: 160 MMHG

## 2020-02-23 VITALS — SYSTOLIC BLOOD PRESSURE: 137 MMHG | DIASTOLIC BLOOD PRESSURE: 71 MMHG

## 2020-02-23 VITALS — DIASTOLIC BLOOD PRESSURE: 71 MMHG | SYSTOLIC BLOOD PRESSURE: 129 MMHG

## 2020-02-23 RX ADMIN — NITROFURANTOIN (MONOHYDRATE/MACROCRYSTALS) SCH MG: 75; 25 CAPSULE ORAL at 19:24

## 2020-02-23 RX ADMIN — ENOXAPARIN SODIUM SCH MG: 30 INJECTION SUBCUTANEOUS at 11:55

## 2020-02-23 RX ADMIN — DOCUSATE SODIUM SCH MG: 100 CAPSULE ORAL at 19:24

## 2020-02-23 RX ADMIN — DOCUSATE SODIUM AND SENNOSIDES SCH EA: 8.6; 5 TABLET, FILM COATED ORAL at 08:34

## 2020-02-23 RX ADMIN — DOCUSATE SODIUM SCH MG: 100 CAPSULE ORAL at 08:34

## 2020-02-23 RX ADMIN — ASPIRIN SCH MG: 325 TABLET, COATED ORAL at 08:34

## 2020-02-23 RX ADMIN — NITROFURANTOIN (MONOHYDRATE/MACROCRYSTALS) SCH MG: 75; 25 CAPSULE ORAL at 08:34

## 2020-02-23 RX ADMIN — DOCUSATE SODIUM AND SENNOSIDES SCH EA: 8.6; 5 TABLET, FILM COATED ORAL at 19:24

## 2020-02-23 NOTE — PROGRESS NOTE - HOSPITALIST
Subjective


HPI/CC On Admission


Date Seen by Provider:  2020


Time Seen by Provider:  09:20


Sarah Sellers is an 87-year-old female who presented after having word finding 

difficulties at home.  She reports that it came on suddenly yesterday morning.  

She denies any focal weakness.  She denies any vision changes.  She denies any 

fevers or chills.  She denies any chest pain or shortness of breath.  She does 

report some dysuria.  She has been feeling nauseous this morning.  She has no 

history of stroke.  She lives at home with her .


Subjective/Events-last exam


She reports that she just doesn't feel well today.  She has no specific 

complaints or concerns.  She says that she slept well.  She denies any nausea or

vomiting.  She denies any pain.





Objective


Exam


Vital Signs





Vital Signs








  Date Time  Temp Pulse Resp B/P (MAP) Pulse Ox O2 Delivery O2 Flow Rate FiO2


 


20 08:00 36.4 69 20 160/69 (99) 98 Room Air  





Capillary Refill : Less Than 3 SecondsLess Than 3 Seconds


General Appearance:  No Apparent Distress, Chronically ill, Thin


Respiratory:  Lungs Clear, Normal Breath Sounds, No Respiratory Distress


Cardiovascular:  Regular Rate, Rhythm, No Edema, No Murmur


Gastrointestinal:  Normal Bowel Sounds, Non Tender, Soft


Extremity:  Normal Inspection, Non Tender, No Pedal Edema


Neurologic/Psychiatric:  Alert, Normal Mood/Affect


Skin:  Warm/Dry, Pallor





Results/Procedures


Lab


Patient resulted labs reviewed.





Assessment/Plan


Assessment and Plan


Assess & Plan/Chief Complaint


Urinary tract infection


urine culture revealed Klebsiella pneumonia


Continue Macrobid





Possible TIA


MRI negative for acute stroke


continue aspirin and Lipitor


symptoms likely attributable to UTI





debility


continue PT/OT


Planning for discharge to [facility] tomorrow





Dysphagia


Continue ST


Dysphagia diet





DVT prophylaxis: Lovenox





Diagnosis/Problems


Diagnosis/Problems





(1) UTI (urinary tract infection)


Status:  Acute


Qualifiers:  


   Urinary tract infection type:  acute cystitis  Hematuria presence:  with 

hematuria  Qualified Codes:  N30.01 - Acute cystitis with hematuria


(2) Encephalopathy acute


Status:  Resolved


Resolution Date/Time:  20 @ 11:16


(3) Debility


Status:  Acute


(4) Dysphagia


Status:  Acute





Clinical Quality Measures


DVT/VTE Risk/Contraindication:


Risk Factor Score Per Nursin


RFS Level Per Nursing on Admit:  1=Low/No VTE PPX





Stroke:


Date of last known well:  2020


Time of last known well:  10:00


Symptoms onset unknown:  No











HERON VELASCO MD              2020 11:49

## 2020-02-24 VITALS — DIASTOLIC BLOOD PRESSURE: 77 MMHG | SYSTOLIC BLOOD PRESSURE: 136 MMHG

## 2020-02-24 VITALS — SYSTOLIC BLOOD PRESSURE: 133 MMHG | DIASTOLIC BLOOD PRESSURE: 69 MMHG

## 2020-02-24 VITALS — DIASTOLIC BLOOD PRESSURE: 76 MMHG | SYSTOLIC BLOOD PRESSURE: 146 MMHG

## 2020-02-24 RX ADMIN — ASPIRIN SCH MG: 325 TABLET, COATED ORAL at 09:09

## 2020-02-24 RX ADMIN — DOCUSATE SODIUM SCH MG: 100 CAPSULE ORAL at 09:09

## 2020-02-24 RX ADMIN — DOCUSATE SODIUM AND SENNOSIDES SCH EA: 8.6; 5 TABLET, FILM COATED ORAL at 09:09

## 2020-02-24 RX ADMIN — ENOXAPARIN SODIUM SCH MG: 30 INJECTION SUBCUTANEOUS at 11:17

## 2020-02-24 RX ADMIN — NITROFURANTOIN (MONOHYDRATE/MACROCRYSTALS) SCH MG: 75; 25 CAPSULE ORAL at 09:09

## 2020-02-24 NOTE — NUR
PALLIATIVE CARE RN:  This RN spoke with patient's  and a friend of the family about 
future POC if she does not progress with her SKILLED placement at the [facility].    Hospice was 
discussed and the  is interested when the time is right.  Patient will discharge 
today to new SKILLED placement at [facility] for today.

## 2020-02-24 NOTE — NUR
Important Message from Medicare presented, reviewed, signed, and placed in patient chart. 
Patient voiced no intention to appeal and deny any needs or further questions at this time.

## 2020-02-24 NOTE — NUR
PALLIATIVE CARE RN  in to see patient.  She is awake in the bed and answered questions 
pertaining to orientation.  Plan is to discharge today to [facility] skilled placement.  No family 
is in the room at the moment to discuss future hospice needs.

## 2020-02-24 NOTE — NUR
CM/SS visited with the patient for discharge planning. 



Plan: The patient will discharge to [facility] today 2/24 at 1:30. 
Finalized discharge were faxed to facility. 



Care Assessment: A care assessment was completed and signed by the patient and this SS. It 
was faxed to [facility] and [facility] [place]. 



CM/SS contacted Anand the patients spouse who verbalized understanding of discharge time. 
No other needs at this time.

## 2020-02-24 NOTE — DISCHARGE SUMMARY
Diagnosis/Chief Complaint


Date of Admission


2020 at 10:51


Date of Discharge





Discharge Date:  2020


Admission Diagnosis


urinary tract infection


Primary Care


Ector Martin MD


Discharge Diagnosis





(1) UTI (urinary tract infection)


Status:  Acute


(2) Encephalopathy acute


Status:  Resolved


(3) Debility


Status:  Acute


(4) Dysphagia


Status:  Acute





Discharge Summary


Discharge Physical Exam


Allergies:  


Coded Allergies:  


     cefdinir (Verified  Allergy, Mild, RASH, 17)


Vitals & I&Os





Vital Signs








  Date Time  Temp Pulse Resp B/P (MAP) Pulse Ox O2 Delivery O2 Flow Rate FiO2


 


20 08:00     97 Room Air  


 


20 08:00 36.5 72 16 146/76 (99)    











Hospital Course


Labs (last 24 hrs)





Microbiology


20 Urine Culture - Final, Complete


          Klebsiella pneumoniae


Patient resulted labs reviewed.





Discharge


Home Medications:





Active Scripts


Active


Reported


Tramadol HCl 50 Mg Tablet 25-50 Mg PO Q8H PRN


Ibuprofen 200 Mg Tablet 400 Mg PO Q8H PRN


Tylenol (Acetaminophen) 325 Mg Tablet 325-650 Mg PO Q8H PRN





Instructions to patient/family


Please see electronic discharge instructions given to patient.





Clinical Quality Measures


DVT/VTE Risk/Contraindication:


Risk Factor Score Per Nursin


RFS Level Per Nursing on Admit:  1=Low/No VTE PPX





Stroke:


Date of last known well:  2020


Time of last known well:  10:00


Symptoms onset unknown:  No





Problem Qualifiers





(1) UTI (urinary tract infection):  


Urinary tract infection type:  acute cystitis  Hematuria presence:  with 

hematuria  Qualified Codes:  N30.01 - Acute cystitis with hematuria








MALKA CHOUDHARY MD              2020 10:32

## 2020-02-24 NOTE — DISCHARGE INST-SKILLED NURSING
Discharge Inst-Skilled NF


Chief Complaint


Sarah Sellers is an 87-year-old female who presented after having word finding 

difficulties at home.  She reports that it came on suddenly yesterday morning.  

She denies any focal weakness.  She denies any vision changes.  She denies any 

fevers or chills.  She denies any chest pain or shortness of breath.  She does 

report some dysuria.  She has been feeling nauseous this morning.  She has no 

history of stroke.  She lives at home with her .





Consult/Follow Up/Orders


Follow Up Appt.:  


With Dr Martin in 1 week.


Skilled NF Admit to:  [facility]


Certification ([facility])


I certify that SNF services are required to be given on an inpatient basis 

because of the above named patient's need for skilled nursing care on a 

continuing basis for the conditions(s) for which he/she was receiving inpatient 

hospital services prior to his/her transfer to the SNF.


Skilled Nursing Facility Order:  Nursing Services, Occupational Ther-Evaluate & 

Treat, Physical Therapy-Evaluate & Treat, Speech Language-Evaluate & Treat


Oxygen Delivery Method:  Room Air


Discharge Diet:  No Restrictions


Daily Activity as Tolerated:  Yes


Resuscitation Status:  Do Not Resuscitate





New & Resume Previous Orders


Malka Ramirez 


Feb 24, 2020 


10:30











MALKA RAMIREZ MD              Feb 24, 2020 10:31

## 2020-03-10 ENCOUNTER — HOSPITAL ENCOUNTER (OUTPATIENT)
Dept: HOSPITAL 75 - LABNPT | Age: 85
End: 2020-03-10
Attending: INTERNAL MEDICINE
Payer: MEDICARE

## 2020-03-10 DIAGNOSIS — R31.9: ICD-10-CM

## 2020-03-10 DIAGNOSIS — R30.0: Primary | ICD-10-CM

## 2020-03-10 LAB
APTT PPP: YELLOW S
BACTERIA #/AREA URNS HPF: (no result) /HPF
BILIRUB UR QL STRIP: NEGATIVE
FIBRINOGEN PPP-MCNC: (no result) MG/DL
GLUCOSE UR STRIP-MCNC: NEGATIVE MG/DL
KETONES UR QL STRIP: NEGATIVE
LEUKOCYTE ESTERASE UR QL STRIP: (no result)
NITRITE UR QL STRIP: NEGATIVE
PH UR STRIP: 6.5 [PH] (ref 5–9)
PROT UR QL STRIP: (no result)
RBC #/AREA URNS HPF: (no result) /HPF
SP GR UR STRIP: 1.01 (ref 1.02–1.02)
SQUAMOUS #/AREA URNS HPF: (no result) /HPF
WBC #/AREA URNS HPF: (no result) /HPF

## 2020-03-10 PROCEDURE — 81000 URINALYSIS NONAUTO W/SCOPE: CPT

## 2020-03-10 PROCEDURE — 87088 URINE BACTERIA CULTURE: CPT

## 2020-06-13 ENCOUNTER — HOSPITAL ENCOUNTER (OUTPATIENT)
Dept: HOSPITAL 75 - CVS | Age: 85
End: 2020-06-13
Attending: INTERNAL MEDICINE
Payer: MEDICARE

## 2020-06-13 DIAGNOSIS — Z01.89: Primary | ICD-10-CM

## 2020-06-13 LAB
APTT PPP: YELLOW S
BACTERIA #/AREA URNS HPF: (no result) /HPF
BILIRUB UR QL STRIP: NEGATIVE
FIBRINOGEN PPP-MCNC: (no result) MG/DL
GLUCOSE UR STRIP-MCNC: NEGATIVE MG/DL
KETONES UR QL STRIP: NEGATIVE
LEUKOCYTE ESTERASE UR QL STRIP: (no result)
NITRITE UR QL STRIP: NEGATIVE
PH UR STRIP: 6 [PH] (ref 5–9)
PROT UR QL STRIP: (no result)
RBC #/AREA URNS HPF: (no result) /HPF
SP GR UR STRIP: 1.02 (ref 1.02–1.02)
SQUAMOUS #/AREA URNS HPF: (no result) /HPF
WBC #/AREA URNS HPF: (no result) /HPF

## 2020-06-13 PROCEDURE — 87088 URINE BACTERIA CULTURE: CPT

## 2020-06-13 PROCEDURE — 81000 URINALYSIS NONAUTO W/SCOPE: CPT

## 2020-06-16 ENCOUNTER — HOSPITAL ENCOUNTER (OUTPATIENT)
Dept: HOSPITAL 75 - CVS | Age: 85
End: 2020-06-16
Attending: INTERNAL MEDICINE
Payer: MEDICARE

## 2020-06-16 DIAGNOSIS — Z01.89: Primary | ICD-10-CM

## 2020-06-16 PROCEDURE — 87077 CULTURE AEROBIC IDENTIFY: CPT

## 2020-06-16 PROCEDURE — 87088 URINE BACTERIA CULTURE: CPT

## 2020-06-16 PROCEDURE — 81000 URINALYSIS NONAUTO W/SCOPE: CPT

## 2023-12-27 NOTE — PHYSICAL THERAPY PROGRESS NOTE
Patient scheduled for 01/02/24 Therapy Progress Note


Pt has recently arrived to her room from ER.  Requested to rest this afternoon 

and initiate PT tomorrow.











TO BHARDWAJ PT             Feb 19, 2020 15:17 [id number]